# Patient Record
Sex: FEMALE | Race: WHITE | Employment: FULL TIME | URBAN - METROPOLITAN AREA
[De-identification: names, ages, dates, MRNs, and addresses within clinical notes are randomized per-mention and may not be internally consistent; named-entity substitution may affect disease eponyms.]

---

## 2017-01-06 DIAGNOSIS — I25.10 CORONARY ARTERY DISEASE INVOLVING NATIVE CORONARY ARTERY OF NATIVE HEART WITHOUT ANGINA PECTORIS: Primary | ICD-10-CM

## 2017-01-06 DIAGNOSIS — I25.119 CORONARY ARTERY DISEASE INVOLVING NATIVE CORONARY ARTERY OF NATIVE HEART WITH ANGINA PECTORIS (H): ICD-10-CM

## 2017-01-06 DIAGNOSIS — I25.10 CAD (CORONARY ARTERY DISEASE): ICD-10-CM

## 2017-01-06 RX ORDER — METOPROLOL TARTRATE 25 MG/1
25 TABLET, FILM COATED ORAL 2 TIMES DAILY
Qty: 180 TABLET | Refills: 2 | Status: SHIPPED | OUTPATIENT
Start: 2017-01-06 | End: 2017-03-03

## 2017-01-06 RX ORDER — ISOSORBIDE MONONITRATE 60 MG/1
60 TABLET, EXTENDED RELEASE ORAL DAILY
Qty: 90 TABLET | Refills: 2 | Status: SHIPPED | OUTPATIENT
Start: 2017-01-06 | End: 2017-10-10

## 2017-01-06 RX ORDER — ROSUVASTATIN CALCIUM 20 MG/1
20 TABLET, COATED ORAL DAILY
Qty: 90 TABLET | Refills: 2 | Status: SHIPPED | OUTPATIENT
Start: 2017-01-06 | End: 2017-09-29

## 2017-01-06 RX ORDER — AMLODIPINE BESYLATE 5 MG/1
TABLET ORAL
Qty: 120 TABLET | Refills: 2 | Status: SHIPPED | OUTPATIENT
Start: 2017-01-06 | End: 2017-07-03

## 2017-01-16 ENCOUNTER — TELEPHONE (OUTPATIENT)
Dept: CARDIOLOGY | Facility: CLINIC | Age: 57
End: 2017-01-16

## 2017-01-16 NOTE — TELEPHONE ENCOUNTER
"Pt calling to report that she has increased her metoprolol dose back up to 25 mg BID. Pt states after she saw Dr Sheppard 10/2016 she temporarily increased to the 25mg BID as her thyroid was running hyper. Once TSH was normalized she dropped the metoprolol back down to 12.5mg BID. However pt has had \"angina\" again recently so she increased back to 25 mg BID. Pt states her angina symptom is chest pain. She feels better on the 25 mg BID. She works at the  at a dental office so she was able to hook herself up and check her VS. P 52 and /69. Pt states she feels fine with lower pulse, no dizziness or lightheadedness, is able to get in a good workout on the treadmill with increase in HR. She has not had any angina recently. Told pt she can continue on 25 mg BID. If angina returns call us PRN we would not want her further increasing the BB with the lower pulse, we would most likely need to review w/ Dr Sheppard to adjust other meds such as Imdur. Pt understands, will call PRN.   "

## 2017-02-08 ENCOUNTER — TRANSFERRED RECORDS (OUTPATIENT)
Dept: CARDIOLOGY | Facility: CLINIC | Age: 57
End: 2017-02-08

## 2017-02-08 LAB
ALBUMIN SERPL-MCNC: 3.9 G/DL
ALP SERPL-CCNC: 61 U/L
ALT SERPL-CCNC: 62 U/L
ANION GAP SERPL CALCULATED.3IONS-SCNC: NORMAL MMOL/L
AST SERPL-CCNC: 43 U/L
BILIRUB SERPL-MCNC: 0.47 MG/DL
BNP SERPL-MCNC: 23.8 PG/ML
BUN SERPL-MCNC: 22 MG/DL
CALCIUM SERPL-MCNC: 9.6 MG/DL
CHLORIDE SERPLBLD-SCNC: 105 MMOL/L
CK-MB: 1.1 NG/ML
CO2 SERPL-SCNC: NORMAL MMOL/L
CREAT SERPL-MCNC: 1 MG/DL
ERYTHROCYTE [DISTWIDTH] IN BLOOD BY AUTOMATED COUNT: 13 %
GFR SERPL CREATININE-BSD FRML MDRD: NORMAL ML/MIN/1.73M2
GLUCOSE SERPL-MCNC: 114 MG/DL (ref 70–99)
HCT VFR BLD AUTO: 42.2 %
HEMOGLOBIN: 13.9 G/DL
MCH RBC QN AUTO: 30 PG
MCHC RBC AUTO-ENTMCNC: 32.9 G/DL
MCV RBC AUTO: 91.1 FL
PLATELET # BLD AUTO: 216 10^9/L
POTASSIUM SERPL-SCNC: 4.8 MMOL/L
PROT SERPL-MCNC: 7.6 G/DL
RBC # BLD AUTO: 4.63 10^12/L
SODIUM SERPL-SCNC: 141 MMOL/L
TROPONIN I BLD-MCNC: 0.04 UG/L
WBC # BLD AUTO: 5.8 10^9/L

## 2017-02-28 ENCOUNTER — TELEPHONE (OUTPATIENT)
Dept: CARDIOLOGY | Facility: CLINIC | Age: 57
End: 2017-02-28

## 2017-02-28 NOTE — TELEPHONE ENCOUNTER
Pt called and said that she has been experiencing intermittent episodes of chest pressure over past several weeks, both with exertion and while resting. She notices the pressure when she walks on her treadmill, and while laying in her bed at night. She went to urgent care two weeks ago and found out her TSH was very low and now has gone hyperthyroid. Pt was having issues with hypothyroidism earlier this year. She denied feeling like her heart is racing with these episodes. Pt has taken SL nitro with a couple of the episodes, but had trouble telling whether it helped to improve her symptoms. She is anxious about these episodes as the is leaving on a cruise Saturday to [place] and [place]. She has been checking her bp at work recently and yesterday it was 120/64 and after 5 minutes of rest it was 100/54. I told pt she should come in to discuss symptoms further as she does have hx of CAD, including small vessel disease, and it would probably give her some peace of mind before she leaves for her vacation. Pt agreed and scheduled to see  3/1 @ 7275. She said she will bring her urgent care records with her from [facility]. Dylan HASSAN

## 2017-03-01 ENCOUNTER — HOSPITAL ENCOUNTER (OUTPATIENT)
Dept: ULTRASOUND IMAGING | Facility: CLINIC | Age: 57
Discharge: HOME | End: 2017-03-01
Attending: INTERNAL MEDICINE | Admitting: INTERNAL MEDICINE
Payer: COMMERCIAL

## 2017-03-01 ENCOUNTER — OFFICE VISIT (OUTPATIENT)
Dept: CARDIOLOGY | Facility: CLINIC | Age: 57
End: 2017-03-01
Payer: COMMERCIAL

## 2017-03-01 VITALS
WEIGHT: 188.9 LBS | SYSTOLIC BLOOD PRESSURE: 118 MMHG | BODY MASS INDEX: 32.25 KG/M2 | DIASTOLIC BLOOD PRESSURE: 74 MMHG | HEART RATE: 76 BPM | HEIGHT: 64 IN

## 2017-03-01 DIAGNOSIS — I20.0 UNSTABLE ANGINA (H): Primary | ICD-10-CM

## 2017-03-01 DIAGNOSIS — I25.10 CORONARY ARTERY DISEASE INVOLVING NATIVE CORONARY ARTERY OF NATIVE HEART WITHOUT ANGINA PECTORIS: ICD-10-CM

## 2017-03-01 DIAGNOSIS — R09.89 ARTERIAL BRUIT: ICD-10-CM

## 2017-03-01 DIAGNOSIS — E78.2 MIXED HYPERLIPIDEMIA: ICD-10-CM

## 2017-03-01 PROCEDURE — 99215 OFFICE O/P EST HI 40 MIN: CPT | Mod: 25 | Performed by: INTERNAL MEDICINE

## 2017-03-01 PROCEDURE — 93005 ELECTROCARDIOGRAM TRACING: CPT | Performed by: INTERNAL MEDICINE

## 2017-03-01 PROCEDURE — 93880 EXTRACRANIAL BILAT STUDY: CPT | Mod: 26 | Performed by: INTERNAL MEDICINE

## 2017-03-01 PROCEDURE — 93880 EXTRACRANIAL BILAT STUDY: CPT

## 2017-03-01 RX ORDER — POTASSIUM CHLORIDE 1500 MG/1
20 TABLET, EXTENDED RELEASE ORAL
Status: CANCELLED | OUTPATIENT
Start: 2017-03-01

## 2017-03-01 RX ORDER — NITROGLYCERIN 0.4 MG/1
0.4 TABLET SUBLINGUAL EVERY 5 MIN PRN
Qty: 25 TABLET | Refills: 3 | Status: SHIPPED | OUTPATIENT
Start: 2017-03-01 | End: 2019-01-30

## 2017-03-01 RX ORDER — LIDOCAINE 40 MG/G
CREAM TOPICAL
Status: CANCELLED | OUTPATIENT
Start: 2017-03-01

## 2017-03-01 RX ORDER — SODIUM CHLORIDE 9 MG/ML
INJECTION, SOLUTION INTRAVENOUS CONTINUOUS
Status: CANCELLED | OUTPATIENT
Start: 2017-03-01

## 2017-03-01 RX ORDER — ASPIRIN 81 MG/1
81 TABLET ORAL DAILY
Status: CANCELLED | OUTPATIENT
Start: 2017-03-01

## 2017-03-01 RX ORDER — LIOTHYRONINE SODIUM 5 UG/1
5 TABLET ORAL DAILY
COMMUNITY

## 2017-03-01 NOTE — MR AVS SNAPSHOT
After Visit Summary   3/1/2017    Marli Nguyen    MRN: [id number]           Patient Information     Date Of Birth          1960        Visit Information        Provider Department      3/1/2017 12:45 PM Joana Sheppard DO [facility] [place]        Today's Diagnoses     Unstable angina (H)    -  1    Coronary artery disease involving native coronary artery of native heart without angina pectoris        Mixed hyperlipidemia        Arterial bruit           Follow-ups after your visit        Future tests that were ordered for you today     Open Future Orders        Priority Expected Expires Ordered    Cardiac Cath: Coronary Angiography Adult Order Routine 3/8/2017 2/24/2018 3/1/2017    US Carotid Bilateral Routine 3/9/2017 3/1/2018 3/1/2017            Who to contact     If you have questions or need follow up information about today's clinic visit or your schedule please contact [facility] [place] directly at [phone].  Normal or non-critical lab and imaging results will be communicated to you by MyChart, letter or phone within 4 business days after the clinic has received the results. If you do not hear from us within 7 days, please contact the clinic through [employer] or phone. If you have a critical or abnormal lab result, we will notify you by phone as soon as possible.  Submit refill requests through [employer] or call your pharmacy and they will forward the refill request to us. Please allow 3 business days for your refill to be completed.          Additional Information About Your Visit        [employer] Information     [employer] gives you secure access to your electronic health record. If you see a primary care provider, you can also send messages to your care team and make appointments. If you have questions, please call your primary care clinic.  If you do not have a primary care provider, please call [phone]  "and they will assist you.        Care EveryWhere ID     This is your Care EveryWhere ID. This could be used by other organizations to access your [facility] medical records  [id number]        Your Vitals Were     Pulse Height BMI (Body Mass Index)             76 1.613 m (5' 3.5\") 32.94 kg/m2          Blood Pressure from Last 3 Encounters:   03/01/17 118/74   10/27/16 114/72   10/14/16 113/68    Weight from Last 3 Encounters:   03/01/17 85.7 kg (188 lb 14.4 oz)   10/27/16 83.7 kg (184 lb 8 oz)   10/12/16 81.3 kg (179 lb 2 oz)              We Performed the Following     EKG 12-lead complete w/read - Clinics (performed today)          Where to get your medicines      These medications were sent to [facility]/pharmacy #[id number] - [place], [place] - [address]  [address]  [address] , [facility] [address]     Phone:  [phone]     nitroglycerin 0.4 MG sublingual tablet          Primary Care Provider Office Phone # Fax #    Aixa Brantley [phone]       [facility] [address]  [facility] [address]        Thank you!     Thank you for choosing [facility] [place]  for your care. Our goal is always to provide you with excellent care. Hearing back from our patients is one way we can continue to improve our services. Please take a few minutes to complete the written survey that you may receive in the mail after your visit with us. Thank you!             Your Updated Medication List - Protect others around you: Learn how to safely use, store and throw away your medicines at www.disposemymeds.org.          This list is accurate as of: 3/1/17  1:14 PM.  Always use your most recent med list.                   Brand Name Dispense Instructions for use    amLODIPine 5 MG tablet    NORVASC    120 tablet    Take 5 mg in AM, take 1/2 tab-2.5 mg in PM       arginine 500 MG tablet      Take 500 mg by mouth 2 times daily       aspirin 81 MG EC tablet      Take 162 mg by " mouth At Bedtime       CALCIUM-VITAMIN D PO      Take 1 tablet by mouth every evening (strength unknown, does note that calcium is 800 mg)       COENZYME Q-10 PO      Take 100 mg by mouth daily       CYTOMEL 5 MCG tablet   Generic drug:  liothyronine      Take 5 mcg by mouth daily       isosorbide mononitrate 60 MG 24 hr tablet    IMDUR    90 tablet    Take 1 tablet (60 mg) by mouth daily       MAGNESIUM OXIDE PO      Take 200 mg by mouth every evening       metoprolol 25 MG tablet    LOPRESSOR    180 tablet    Take 1 tablet (25 mg) by mouth 2 times daily       nitroglycerin 0.4 MG sublingual tablet    NITROSTAT    25 tablet    Place 1 tablet (0.4 mg) under the tongue every 5 minutes as needed       OMEGA-3 FISH OIL PO      Take 2 g by mouth 2 times daily       PROLIA 60 MG/ML Soln injection   Generic drug:  denosumab      Inject 60 mg Subcutaneous every 6 months       rosuvastatin 20 MG tablet    CRESTOR    90 tablet    Take 1 tablet (20 mg) by mouth daily       SYNTHROID PO      Take 88 mcg by mouth       VITAMIN D (CHOLECALCIFEROL) PO      Take 1,000 Units by mouth every evening

## 2017-03-01 NOTE — LETTER
3/1/2017    Aixa Brantley MD  [facility]   [address]  [place] [address]    RE: Marli HUERTA Wendy       Dear Colleague,    I had the pleasure of seeing Marli HUERTA Wendy in the [facility].    REFERRING PHYSICIAN:  Dr. Aixa Brantley.      Ms. Nguyen is a very pleasant 56-year-old female with a history of coronary artery disease, previous 2-vessel CABG in 2006 with a LIMA graft to the LAD and vein graft to an obtuse marginal 2.  She had direct left main stenting in 2013.  She is here for a followup visit today.  She has had recurrent symptoms of chest discomfort with radiation down her left arm and into her jaw.  She was seen at urgent care in [place] at the beginning of this month and underwent some testing including blood work with troponin and electrocardiogram.  I was able to review these with her today.  She had normal cardiac enzymes, and her EKG was fairly unremarkable.  She was asked to follow up in clinic today.  Of note, she has had much difficulty controlling her thyroid.  They did check a TSH at that time and it was quite low at 0.121.  She has had titration of her thyroid medication recently but notes that she does have chest pain with the higher dose, and therefore, it sounds like they did back off on this dose somewhat.  She has exertional symptoms, but her symptoms also are precipitated by emotional stress and she does endorse quite a bit of stress at work currently that is aggravating her condition.  She did have an episode of rest pain this morning.  She took 3 sublingual nitroglycerin spaced several minutes apart with ultimate relief of her symptoms.      PHYSICAL EXAMINATION:   VITAL SIGNS:  On exam today, her blood pressure is 118/74, pulse is 76, weight 188 pounds with a body mass index of 33.   NECK:  Carotid upstrokes seem brisk.  I do not appreciate carotid bruits.   CARDIOVASCULAR:  Tones are regular.  I do not hear a murmur, gallop  or rub.   LUNGS:  Clear posteriorly.  She has diminished pulse noted on the left radial as compared to her right radial, and therefore I did auscultate the upper chest area around her subclavian artery, and I do hear a soft murmur here, so there is concern that there may be some subclavian artery stenosis.   EXTREMITIES:  She has no peripheral edema.     Outpatient Encounter Prescriptions as of 3/1/2017   Medication Sig Dispense Refill     liothyronine (CYTOMEL) 5 MCG tablet Take 5 mcg by mouth daily       nitroglycerin (NITROSTAT) 0.4 MG sublingual tablet Place 1 tablet (0.4 mg) under the tongue every 5 minutes as needed 25 tablet 3     rosuvastatin (CRESTOR) 20 MG tablet Take 1 tablet (20 mg) by mouth daily 90 tablet 2     amLODIPine (NORVASC) 5 MG tablet Take 5 mg in AM, take 1/2 tab-2.5 mg in  tablet 2     isosorbide mononitrate (IMDUR) 60 MG 24 hr tablet Take 1 tablet (60 mg) by mouth daily 90 tablet 2     [DISCONTINUED] metoprolol (LOPRESSOR) 25 MG tablet Take 1 tablet (25 mg) by mouth 2 times daily 180 tablet 2     denosumab (PROLIA) 60 MG/ML SOLN Inject 60 mg Subcutaneous every 6 months       CALCIUM-VITAMIN D PO Take 1 tablet by mouth every evening (strength unknown, does note that calcium is 800 mg)       VITAMIN D, CHOLECALCIFEROL, PO Take 1,000 Units by mouth every evening       MAGNESIUM OXIDE PO Take 200 mg by mouth every evening       COENZYME Q-10 PO Take 100 mg by mouth daily       arginine 500 MG tablet Take 500 mg by mouth 2 times daily        Omega-3 Fatty Acids (OMEGA-3 FISH OIL PO) Take 2 g by mouth 2 times daily        aspirin 81 MG EC tablet Take 162 mg by mouth At Bedtime        Levothyroxine Sodium (SYNTHROID PO) Take 88 mcg by mouth daily        [DISCONTINUED] nitroglycerin (NITROSTAT) 0.4 MG SL tablet Place 1 tablet (0.4 mg) under the tongue every 5 minutes as needed 25 tablet 3     No facility-administered encounter medications on file as of 3/1/2017.       ASSESSMENT AND PLAN:  In  summary, Ms. Nguyen is a very pleasant 56-year-old female with a history of coronary disease, previous 2-vessel CABG with the LIMA graft to LAD and vein graft to obtuse marginal.  She has direct left main stenting as well.  She is having symptoms highly suggestive of unstable angina with an episode of rest pain this morning that was relieved after 3 sublingual nitroglycerin.  Her picture, of course, is complicated by a trip that she has planned on Saturday out of the country.  I feel very uncomfortable with her continuing with this trip without ischemic evaluation.  I do not feel a stress test would be the best option given that she has had previous left main stenting and bypass surgery.  If she should have a stenosis at that left main stent, she may end up having what is perceived as balanced ischemia or a normal stress test with nuclear perfusion imaging.  I would prefer that we define her anatomy with coronary angiogram, and I explained this to her today.  In addition to this, I would like to evaluate her subclavian artery.  She does have a LIMA graft to the LAD, and certainly subclavian artery stenosis is of concern here too based upon the bruit I auscultate on exam in the left upper chest area.  I did explain the risks, benefits and alternatives about the angiogram.  She is well skilled in this and has had it several times and does not have any questions or concerns and is willing to proceed at this time.  We talked about possibly increasing her beta blocker to 50 mg twice daily, especially since her symptoms do seem to be catacholmine driven with symptoms with escalated thyroid supplementation and also emotional stress.  We will certainly consider this, but again I would like to do an ischemic evaluation with coronary angiogram and see just what we are dealing with here.  I will have her follow up with the results of the above testing once it is complete.  Please feel free to contact me with any questions you  have in regards to her care.      Again, thank you for allowing me to participate in the care of your patient.      Sincerely,    Joana Sheppard,      [facility]

## 2017-03-01 NOTE — PROGRESS NOTES
HPI and Plan:   See dictation    Orders Placed This Encounter   Procedures     US Carotid Bilateral     EKG 12-lead complete w/read - Clinics (performed today)       Orders Placed This Encounter   Medications     liothyronine (CYTOMEL) 5 MCG tablet     Sig: Take 5 mcg by mouth daily     nitroglycerin (NITROSTAT) 0.4 MG sublingual tablet     Sig: Place 1 tablet (0.4 mg) under the tongue every 5 minutes as needed     Dispense:  25 tablet     Refill:  3       Medications Discontinued During This Encounter   Medication Reason     nitroglycerin (NITROSTAT) 0.4 MG SL tablet Reorder         Encounter Diagnoses   Name Primary?     Unstable angina (H) Yes     Coronary artery disease involving native coronary artery of native heart without angina pectoris      Mixed hyperlipidemia      Arterial bruit        CURRENT MEDICATIONS:  Current Outpatient Prescriptions   Medication Sig Dispense Refill     liothyronine (CYTOMEL) 5 MCG tablet Take 5 mcg by mouth daily       nitroglycerin (NITROSTAT) 0.4 MG sublingual tablet Place 1 tablet (0.4 mg) under the tongue every 5 minutes as needed 25 tablet 3     rosuvastatin (CRESTOR) 20 MG tablet Take 1 tablet (20 mg) by mouth daily 90 tablet 2     amLODIPine (NORVASC) 5 MG tablet Take 5 mg in AM, take 1/2 tab-2.5 mg in  tablet 2     metoprolol (LOPRESSOR) 25 MG tablet Take 1 tablet (25 mg) by mouth 2 times daily 180 tablet 2     isosorbide mononitrate (IMDUR) 60 MG 24 hr tablet Take 1 tablet (60 mg) by mouth daily 90 tablet 2     denosumab (PROLIA) 60 MG/ML SOLN Inject 60 mg Subcutaneous every 6 months       CALCIUM-VITAMIN D PO Take 1 tablet by mouth every evening (strength unknown, does note that calcium is 800 mg)       VITAMIN D, CHOLECALCIFEROL, PO Take 1,000 Units by mouth every evening       MAGNESIUM OXIDE PO Take 200 mg by mouth every evening       COENZYME Q-10 PO Take 100 mg by mouth daily       arginine 500 MG tablet Take 500 mg by mouth 2 times daily        Omega-3 Fatty  Acids (OMEGA-3 FISH OIL PO) Take 2 g by mouth 2 times daily        aspirin 81 MG EC tablet Take 162 mg by mouth At Bedtime        Levothyroxine Sodium (SYNTHROID PO) Take 88 mcg by mouth        [DISCONTINUED] nitroglycerin (NITROSTAT) 0.4 MG SL tablet Place 1 tablet (0.4 mg) under the tongue every 5 minutes as needed 25 tablet 3       ALLERGIES     Allergies   Allergen Reactions     Bactrim [Sulfamethoxazole W/Trimethoprim]      rash     Erythromycin Cramps     Sulfa Drugs      Tape [Adhesive Tape]        PAST MEDICAL HISTORY:  Past Medical History   Diagnosis Date     Chronic left hip pain      Coronary artery disease      s/p CABG 2006 (LIMA to LAD and saphneous vein graft to OM1 and OM3), and KERRI to left main in 2013     DM type 2 (diabetes mellitus, type 2) (H)      Gallstone pancreatitis 2012     Graves disease      s/p radioiodide therapy in 2001, now takes synthroid     Hyperlipidemia      Hypertension        PAST SURGICAL HISTORY:  Past Surgical History   Procedure Laterality Date     Lasik bilateral       Right thumb mass excision  2008     Cholecystectomy  4/12     Carpal tunnel release rt/lt       Dilation and curettage, hysteroscopy, ablate endometrium novasure, combined  6/28/2012     Procedure: COMBINED DILATION AND CURETTAGE, HYSTEROSCOPY, ABLATE ENDOMETRIUM NOVASURE;  DILATION AND CURETTAGE, HYSTEROSCOPY, ABLATE ENDOMETRIUM,pelvic exam under anesthesia;  Surgeon: Johnna Harley MD;  Location: [facility] OR     Laparoscopic hysterectomy total, bilateral salpingo-oophorectomy, combined  9/20/2012     Procedure: COMBINED LAPAROSCOPIC HYSTERECTOMY TOTAL, SALPINGO-OOPHORECTOMY;  LAPAROSCOPIC HYSTERECTOMY TOTAL, SALPINGO-OOPHORECTOMY;  Surgeon: Johnna Harley MD;  Location: [facility] OR     [place] teeth removal       Repair vaginal cuff  10/31/2012     Procedure: REPAIR VAGINAL CUFF;  Irrigation and repair of Vaginal Cuff;  Surgeon: Johnna Harley MD;  Location: [facility] OR     Coronary artery bypass  3/2006     LIMA  to LAD, sequential SV grafts to OM1 and OM3     Coronary angiography adult order  11/2006     70% ostial left main lesion, 50-60% mid LAD stenosis prox. to LIMA insertion site, circumflex with widely patent saph vein graft into first obtuse marginal, tiny posterolateral branch occluded     Coronary angiography adult order  12/2006     attempted circ OM3 PTCA     Coronary angiography adult order  6/2013     KERRI to ostial left main, widely patent LIMA to LAD and saphenous vein graft     Coronary angiography adult order  1/2014     left main stent widely patent, LIMA to LAD and vein graft to first obtuse marginal patent     Coronary angiography adult order  10/13/16     Left main:there is evidence of previous stent implantation in the left main with no evidence of restenosis. The distal and mid LAD are free of signigficant narrowing. The bypass graft to the marginal branch is widely patent. The study is unchanged since her last study on 11/14/2014.       FAMILY HISTORY:  History reviewed. No pertinent family history.    SOCIAL HISTORY:  Social History     Social History     Marital status:      Spouse name: N/A     Number of children: N/A     Years of education: N/A     Social History Main Topics     Smoking status: Never Smoker     Smokeless tobacco: Never Used     Alcohol use 0.0 oz/week     0 Standard drinks or equivalent per week      Comment: rare     Drug use: No     Sexual activity: Not Asked     Other Topics Concern     Caffeine Concern Yes     10 oz of coffee per day     Sleep Concern No     Weight Concern Yes     weight gain     Special Diet Yes     gluten free     Exercise Yes     walking 2 miles daily     Social History Narrative       Review of Systems:  Skin:  Negative       Eyes:  Positive for glasses    ENT:  Negative      Respiratory:  Negative       Cardiovascular:  Negative;palpitations;syncope or near-syncope;cyanosis;edema;lightheadedness;dizziness Positive for;fatigue;exercise intolerance  "palpitations yesterday, chest pressure, took nitro X3 at 4am, effective  Gastroenterology: Negative      Genitourinary:  Negative      Musculoskeletal:  Positive for back pain    Neurologic:  Negative      Psychiatric:  Positive for sleep disturbances;excessive stress;anxiety    Heme/Lymph/Imm:  Negative      Endocrine:  Positive for thyroid disorder      Physical Exam:  Vitals: /74  Pulse 76  Ht 1.613 m (5' 3.5\")  Wt 85.7 kg (188 lb 14.4 oz)  BMI 32.94 kg/m2    Constitutional:           Skin:           Head:           Eyes:           ENT:           Neck:           Chest:             Cardiac:                    Abdomen:           Vascular:                                          Extremities and Back:                 Neurological:                 CC  No referring provider defined for this encounter.                  "

## 2017-03-02 ENCOUNTER — APPOINTMENT (OUTPATIENT)
Dept: CARDIOLOGY | Facility: CLINIC | Age: 57
End: 2017-03-02
Attending: INTERNAL MEDICINE
Payer: COMMERCIAL

## 2017-03-02 ENCOUNTER — HOSPITAL ENCOUNTER (OUTPATIENT)
Facility: CLINIC | Age: 57
Discharge: HOME | End: 2017-03-02
Attending: INTERNAL MEDICINE | Admitting: INTERNAL MEDICINE
Payer: COMMERCIAL

## 2017-03-02 VITALS
OXYGEN SATURATION: 98 % | BODY MASS INDEX: 33.49 KG/M2 | TEMPERATURE: 97.9 F | HEIGHT: 63 IN | HEART RATE: 51 BPM | DIASTOLIC BLOOD PRESSURE: 66 MMHG | SYSTOLIC BLOOD PRESSURE: 124 MMHG | RESPIRATION RATE: 16 BRPM | WEIGHT: 189 LBS

## 2017-03-02 DIAGNOSIS — I20.0 UNSTABLE ANGINA (H): ICD-10-CM

## 2017-03-02 DIAGNOSIS — R09.89 ARTERIAL BRUIT: ICD-10-CM

## 2017-03-02 DIAGNOSIS — I25.10 CORONARY ARTERY DISEASE INVOLVING NATIVE CORONARY ARTERY OF NATIVE HEART WITHOUT ANGINA PECTORIS: ICD-10-CM

## 2017-03-02 DIAGNOSIS — E78.2 MIXED HYPERLIPIDEMIA: ICD-10-CM

## 2017-03-02 LAB
ANION GAP SERPL CALCULATED.3IONS-SCNC: 5 MMOL/L (ref 3–14)
APTT PPP: 31 SEC (ref 22–37)
BUN SERPL-MCNC: 22 MG/DL (ref 7–30)
CALCIUM SERPL-MCNC: 9.3 MG/DL (ref 8.5–10.1)
CHLORIDE SERPL-SCNC: 109 MMOL/L (ref 94–109)
CO2 SERPL-SCNC: 29 MMOL/L (ref 20–32)
CREAT SERPL-MCNC: 0.86 MG/DL (ref 0.52–1.04)
ERYTHROCYTE [DISTWIDTH] IN BLOOD BY AUTOMATED COUNT: 13.7 % (ref 10–15)
GFR SERPL CREATININE-BSD FRML MDRD: 67 ML/MIN/1.7M2
GLUCOSE SERPL-MCNC: 114 MG/DL (ref 70–99)
HCT VFR BLD AUTO: 43.9 % (ref 35–47)
HGB BLD-MCNC: 14.7 G/DL (ref 11.7–15.7)
INR PPP: 0.92 (ref 0.86–1.14)
MCH RBC QN AUTO: 30.5 PG (ref 26.5–33)
MCHC RBC AUTO-ENTMCNC: 33.5 G/DL (ref 31.5–36.5)
MCV RBC AUTO: 91 FL (ref 78–100)
PLATELET # BLD AUTO: 204 10E9/L (ref 150–450)
POTASSIUM SERPL-SCNC: 4.7 MMOL/L (ref 3.4–5.3)
RBC # BLD AUTO: 4.82 10E12/L (ref 3.8–5.2)
SODIUM SERPL-SCNC: 143 MMOL/L (ref 133–144)
WBC # BLD AUTO: 5.6 10E9/L (ref 4–11)

## 2017-03-02 PROCEDURE — B2111ZZ FLUOROSCOPY OF MULTIPLE CORONARY ARTERIES USING LOW OSMOLAR CONTRAST: ICD-10-PCS | Performed by: INTERNAL MEDICINE

## 2017-03-02 PROCEDURE — 93459 L HRT ART/GRFT ANGIO: CPT

## 2017-03-02 PROCEDURE — 93010 ELECTROCARDIOGRAM REPORT: CPT | Performed by: INTERNAL MEDICINE

## 2017-03-02 PROCEDURE — 99153 MOD SED SAME PHYS/QHP EA: CPT

## 2017-03-02 PROCEDURE — 99152 MOD SED SAME PHYS/QHP 5/>YRS: CPT

## 2017-03-02 PROCEDURE — 27211089 ZZH KIT ACIST INJECTOR CR3

## 2017-03-02 PROCEDURE — 85027 COMPLETE CBC AUTOMATED: CPT | Performed by: INTERNAL MEDICINE

## 2017-03-02 PROCEDURE — 40000852 ZZH STATISTIC HEART CATH LAB OR EP LAB

## 2017-03-02 PROCEDURE — 93459 L HRT ART/GRFT ANGIO: CPT | Mod: 26 | Performed by: INTERNAL MEDICINE

## 2017-03-02 PROCEDURE — 25000128 H RX IP 250 OP 636: Performed by: INTERNAL MEDICINE

## 2017-03-02 PROCEDURE — 25000125 ZZHC RX 250: Performed by: INTERNAL MEDICINE

## 2017-03-02 PROCEDURE — 99153 MOD SED SAME PHYS/QHP EA: CPT | Mod: GC | Performed by: INTERNAL MEDICINE

## 2017-03-02 PROCEDURE — 36215 PLACE CATHETER IN ARTERY: CPT | Mod: XU

## 2017-03-02 PROCEDURE — B2151ZZ FLUOROSCOPY OF LEFT HEART USING LOW OSMOLAR CONTRAST: ICD-10-PCS | Performed by: INTERNAL MEDICINE

## 2017-03-02 PROCEDURE — B3121ZZ FLUOROSCOPY OF LEFT SUBCLAVIAN ARTERY USING LOW OSMOLAR CONTRAST: ICD-10-PCS | Performed by: INTERNAL MEDICINE

## 2017-03-02 PROCEDURE — 80048 BASIC METABOLIC PNL TOTAL CA: CPT | Performed by: INTERNAL MEDICINE

## 2017-03-02 PROCEDURE — 27210787 ZZH MANIFOLD CR2

## 2017-03-02 PROCEDURE — 85610 PROTHROMBIN TIME: CPT | Performed by: INTERNAL MEDICINE

## 2017-03-02 PROCEDURE — 99152 MOD SED SAME PHYS/QHP 5/>YRS: CPT | Mod: GC | Performed by: INTERNAL MEDICINE

## 2017-03-02 PROCEDURE — 93005 ELECTROCARDIOGRAM TRACING: CPT

## 2017-03-02 PROCEDURE — 25000132 ZZH RX MED GY IP 250 OP 250 PS 637: Performed by: INTERNAL MEDICINE

## 2017-03-02 PROCEDURE — 75710 ARTERY X-RAYS ARM/LEG: CPT | Mod: XU

## 2017-03-02 PROCEDURE — 85730 THROMBOPLASTIN TIME PARTIAL: CPT | Performed by: INTERNAL MEDICINE

## 2017-03-02 PROCEDURE — 27210795 ZZH PAD DEFIB QUICK CR4

## 2017-03-02 PROCEDURE — 4A023N7 MEASUREMENT OF CARDIAC SAMPLING AND PRESSURE, LEFT HEART, PERCUTANEOUS APPROACH: ICD-10-PCS | Performed by: INTERNAL MEDICINE

## 2017-03-02 PROCEDURE — 36415 COLL VENOUS BLD VENIPUNCTURE: CPT | Performed by: INTERNAL MEDICINE

## 2017-03-02 PROCEDURE — 27210946 ZZH KIT HC TOTES DISP CR8

## 2017-03-02 RX ORDER — BUPIVACAINE HYDROCHLORIDE 2.5 MG/ML
1-10 INJECTION, SOLUTION EPIDURAL; INFILTRATION; INTRACAUDAL
Status: DISCONTINUED | OUTPATIENT
Start: 2017-03-02 | End: 2017-03-02 | Stop reason: HOSPADM

## 2017-03-02 RX ORDER — ACETAMINOPHEN 325 MG/1
325-650 TABLET ORAL EVERY 4 HOURS PRN
Status: DISCONTINUED | OUTPATIENT
Start: 2017-03-02 | End: 2017-03-02 | Stop reason: HOSPADM

## 2017-03-02 RX ORDER — FUROSEMIDE 10 MG/ML
20-100 INJECTION INTRAMUSCULAR; INTRAVENOUS
Status: DISCONTINUED | OUTPATIENT
Start: 2017-03-02 | End: 2017-03-02 | Stop reason: HOSPADM

## 2017-03-02 RX ORDER — NITROGLYCERIN 5 MG/ML
100-500 VIAL (ML) INTRAVENOUS
Status: DISCONTINUED | OUTPATIENT
Start: 2017-03-02 | End: 2017-03-02 | Stop reason: HOSPADM

## 2017-03-02 RX ORDER — LIDOCAINE HYDROCHLORIDE 10 MG/ML
30 INJECTION, SOLUTION EPIDURAL; INFILTRATION; INTRACAUDAL; PERINEURAL
Status: DISCONTINUED | OUTPATIENT
Start: 2017-03-02 | End: 2017-03-02 | Stop reason: HOSPADM

## 2017-03-02 RX ORDER — NALOXONE HYDROCHLORIDE 0.4 MG/ML
.2-.4 INJECTION, SOLUTION INTRAMUSCULAR; INTRAVENOUS; SUBCUTANEOUS
Status: DISCONTINUED | OUTPATIENT
Start: 2017-03-02 | End: 2017-03-02 | Stop reason: HOSPADM

## 2017-03-02 RX ORDER — FENTANYL CITRATE 50 UG/ML
25-50 INJECTION, SOLUTION INTRAMUSCULAR; INTRAVENOUS
Status: DISCONTINUED | OUTPATIENT
Start: 2017-03-02 | End: 2017-03-02 | Stop reason: HOSPADM

## 2017-03-02 RX ORDER — POTASSIUM CHLORIDE 29.8 MG/ML
20 INJECTION INTRAVENOUS
Status: DISCONTINUED | OUTPATIENT
Start: 2017-03-02 | End: 2017-03-02 | Stop reason: HOSPADM

## 2017-03-02 RX ORDER — DOBUTAMINE HYDROCHLORIDE 200 MG/100ML
2-20 INJECTION INTRAVENOUS CONTINUOUS PRN
Status: DISCONTINUED | OUTPATIENT
Start: 2017-03-02 | End: 2017-03-02 | Stop reason: HOSPADM

## 2017-03-02 RX ORDER — VERAPAMIL HYDROCHLORIDE 2.5 MG/ML
1-2.5 INJECTION, SOLUTION INTRAVENOUS
Status: DISCONTINUED | OUTPATIENT
Start: 2017-03-02 | End: 2017-03-02 | Stop reason: HOSPADM

## 2017-03-02 RX ORDER — PROTAMINE SULFATE 10 MG/ML
25-100 INJECTION, SOLUTION INTRAVENOUS EVERY 5 MIN PRN
Status: DISCONTINUED | OUTPATIENT
Start: 2017-03-02 | End: 2017-03-02 | Stop reason: HOSPADM

## 2017-03-02 RX ORDER — NALOXONE HYDROCHLORIDE 0.4 MG/ML
0.4 INJECTION, SOLUTION INTRAMUSCULAR; INTRAVENOUS; SUBCUTANEOUS EVERY 5 MIN PRN
Status: DISCONTINUED | OUTPATIENT
Start: 2017-03-02 | End: 2017-03-02 | Stop reason: HOSPADM

## 2017-03-02 RX ORDER — DEXTROSE MONOHYDRATE 25 G/50ML
12.5-5 INJECTION, SOLUTION INTRAVENOUS EVERY 30 MIN PRN
Status: DISCONTINUED | OUTPATIENT
Start: 2017-03-02 | End: 2017-03-02 | Stop reason: HOSPADM

## 2017-03-02 RX ORDER — METHYLPREDNISOLONE SODIUM SUCCINATE 125 MG/2ML
125 INJECTION, POWDER, LYOPHILIZED, FOR SOLUTION INTRAMUSCULAR; INTRAVENOUS
Status: DISCONTINUED | OUTPATIENT
Start: 2017-03-02 | End: 2017-03-02 | Stop reason: HOSPADM

## 2017-03-02 RX ORDER — ISOSORBIDE MONONITRATE 30 MG/1
30 TABLET, EXTENDED RELEASE ORAL EVERY MORNING
Qty: 90 TABLET | Refills: 3 | Status: SHIPPED | OUTPATIENT
Start: 2017-03-02 | End: 2017-03-02

## 2017-03-02 RX ORDER — ASPIRIN 81 MG/1
81-324 TABLET, CHEWABLE ORAL
Status: DISCONTINUED | OUTPATIENT
Start: 2017-03-02 | End: 2017-03-02 | Stop reason: HOSPADM

## 2017-03-02 RX ORDER — LIDOCAINE HYDROCHLORIDE 10 MG/ML
1-10 INJECTION, SOLUTION EPIDURAL; INFILTRATION; INTRACAUDAL; PERINEURAL
Status: COMPLETED | OUTPATIENT
Start: 2017-03-02 | End: 2017-03-02

## 2017-03-02 RX ORDER — HYDROCODONE BITARTRATE AND ACETAMINOPHEN 5; 325 MG/1; MG/1
1-2 TABLET ORAL EVERY 4 HOURS PRN
Status: DISCONTINUED | OUTPATIENT
Start: 2017-03-02 | End: 2017-03-02 | Stop reason: HOSPADM

## 2017-03-02 RX ORDER — ONDANSETRON 2 MG/ML
4 INJECTION INTRAMUSCULAR; INTRAVENOUS EVERY 4 HOURS PRN
Status: DISCONTINUED | OUTPATIENT
Start: 2017-03-02 | End: 2017-03-02 | Stop reason: HOSPADM

## 2017-03-02 RX ORDER — LORAZEPAM 2 MG/ML
.5-2 INJECTION INTRAMUSCULAR EVERY 4 HOURS PRN
Status: DISCONTINUED | OUTPATIENT
Start: 2017-03-02 | End: 2017-03-02 | Stop reason: HOSPADM

## 2017-03-02 RX ORDER — SODIUM CHLORIDE 9 MG/ML
INJECTION, SOLUTION INTRAVENOUS CONTINUOUS
Status: DISCONTINUED | OUTPATIENT
Start: 2017-03-02 | End: 2017-03-02 | Stop reason: HOSPADM

## 2017-03-02 RX ORDER — HEPARIN SODIUM 1000 [USP'U]/ML
1000-10000 INJECTION, SOLUTION INTRAVENOUS; SUBCUTANEOUS EVERY 5 MIN PRN
Status: DISCONTINUED | OUTPATIENT
Start: 2017-03-02 | End: 2017-03-02 | Stop reason: HOSPADM

## 2017-03-02 RX ORDER — POTASSIUM CHLORIDE 7.45 MG/ML
10 INJECTION INTRAVENOUS
Status: DISCONTINUED | OUTPATIENT
Start: 2017-03-02 | End: 2017-03-02 | Stop reason: HOSPADM

## 2017-03-02 RX ORDER — PHENYLEPHRINE HCL IN 0.9% NACL 1 MG/10 ML
20-100 SYRINGE (ML) INTRAVENOUS
Status: DISCONTINUED | OUTPATIENT
Start: 2017-03-02 | End: 2017-03-02 | Stop reason: HOSPADM

## 2017-03-02 RX ORDER — MORPHINE SULFATE 2 MG/ML
1-2 INJECTION, SOLUTION INTRAMUSCULAR; INTRAVENOUS EVERY 5 MIN PRN
Status: DISCONTINUED | OUTPATIENT
Start: 2017-03-02 | End: 2017-03-02 | Stop reason: HOSPADM

## 2017-03-02 RX ORDER — CLOPIDOGREL BISULFATE 75 MG/1
300-600 TABLET ORAL
Status: DISCONTINUED | OUTPATIENT
Start: 2017-03-02 | End: 2017-03-02 | Stop reason: HOSPADM

## 2017-03-02 RX ORDER — NITROGLYCERIN 20 MG/100ML
.07-2 INJECTION INTRAVENOUS CONTINUOUS PRN
Status: DISCONTINUED | OUTPATIENT
Start: 2017-03-02 | End: 2017-03-02 | Stop reason: HOSPADM

## 2017-03-02 RX ORDER — NITROGLYCERIN 0.4 MG/1
0.4 TABLET SUBLINGUAL EVERY 5 MIN PRN
Status: DISCONTINUED | OUTPATIENT
Start: 2017-03-02 | End: 2017-03-02 | Stop reason: HOSPADM

## 2017-03-02 RX ORDER — CLOPIDOGREL BISULFATE 75 MG/1
75 TABLET ORAL
Status: DISCONTINUED | OUTPATIENT
Start: 2017-03-02 | End: 2017-03-02 | Stop reason: HOSPADM

## 2017-03-02 RX ORDER — LIDOCAINE 40 MG/G
CREAM TOPICAL
Status: DISCONTINUED | OUTPATIENT
Start: 2017-03-02 | End: 2017-03-02 | Stop reason: HOSPADM

## 2017-03-02 RX ORDER — PROMETHAZINE HYDROCHLORIDE 25 MG/ML
6.25-25 INJECTION, SOLUTION INTRAMUSCULAR; INTRAVENOUS EVERY 4 HOURS PRN
Status: DISCONTINUED | OUTPATIENT
Start: 2017-03-02 | End: 2017-03-02 | Stop reason: HOSPADM

## 2017-03-02 RX ORDER — FLUMAZENIL 0.1 MG/ML
0.2 INJECTION, SOLUTION INTRAVENOUS
Status: DISCONTINUED | OUTPATIENT
Start: 2017-03-02 | End: 2017-03-02 | Stop reason: HOSPADM

## 2017-03-02 RX ORDER — EPTIFIBATIDE 2 MG/ML
180 INJECTION, SOLUTION INTRAVENOUS EVERY 10 MIN PRN
Status: DISCONTINUED | OUTPATIENT
Start: 2017-03-02 | End: 2017-03-02 | Stop reason: HOSPADM

## 2017-03-02 RX ORDER — NALOXONE HYDROCHLORIDE 0.4 MG/ML
.1-.4 INJECTION, SOLUTION INTRAMUSCULAR; INTRAVENOUS; SUBCUTANEOUS
Status: DISCONTINUED | OUTPATIENT
Start: 2017-03-02 | End: 2017-03-02 | Stop reason: HOSPADM

## 2017-03-02 RX ORDER — PRASUGREL 10 MG/1
10-60 TABLET, FILM COATED ORAL
Status: DISCONTINUED | OUTPATIENT
Start: 2017-03-02 | End: 2017-03-02 | Stop reason: HOSPADM

## 2017-03-02 RX ORDER — POTASSIUM CHLORIDE 1500 MG/1
20 TABLET, EXTENDED RELEASE ORAL
Status: DISCONTINUED | OUTPATIENT
Start: 2017-03-02 | End: 2017-03-02 | Stop reason: HOSPADM

## 2017-03-02 RX ORDER — NICARDIPINE HYDROCHLORIDE 2.5 MG/ML
100 INJECTION INTRAVENOUS
Status: DISCONTINUED | OUTPATIENT
Start: 2017-03-02 | End: 2017-03-02 | Stop reason: HOSPADM

## 2017-03-02 RX ORDER — ASPIRIN 81 MG/1
81 TABLET ORAL DAILY
Status: DISCONTINUED | OUTPATIENT
Start: 2017-03-02 | End: 2017-03-02 | Stop reason: HOSPADM

## 2017-03-02 RX ORDER — PROTAMINE SULFATE 10 MG/ML
1-5 INJECTION, SOLUTION INTRAVENOUS
Status: DISCONTINUED | OUTPATIENT
Start: 2017-03-02 | End: 2017-03-02 | Stop reason: HOSPADM

## 2017-03-02 RX ORDER — DIPHENHYDRAMINE HYDROCHLORIDE 50 MG/ML
25-50 INJECTION INTRAMUSCULAR; INTRAVENOUS
Status: DISCONTINUED | OUTPATIENT
Start: 2017-03-02 | End: 2017-03-02 | Stop reason: HOSPADM

## 2017-03-02 RX ORDER — ENALAPRILAT 1.25 MG/ML
1.25-2.5 INJECTION INTRAVENOUS
Status: DISCONTINUED | OUTPATIENT
Start: 2017-03-02 | End: 2017-03-02 | Stop reason: HOSPADM

## 2017-03-02 RX ORDER — ADENOSINE 3 MG/ML
12-12000 INJECTION, SOLUTION INTRAVENOUS
Status: DISCONTINUED | OUTPATIENT
Start: 2017-03-02 | End: 2017-03-02 | Stop reason: HOSPADM

## 2017-03-02 RX ORDER — IOPAMIDOL 755 MG/ML
150 INJECTION, SOLUTION INTRAVASCULAR ONCE
Status: COMPLETED | OUTPATIENT
Start: 2017-03-02 | End: 2017-03-02

## 2017-03-02 RX ORDER — NIFEDIPINE 10 MG/1
10 CAPSULE ORAL
Status: DISCONTINUED | OUTPATIENT
Start: 2017-03-02 | End: 2017-03-02 | Stop reason: HOSPADM

## 2017-03-02 RX ORDER — SODIUM NITROPRUSSIDE 25 MG/ML
100-200 INJECTION INTRAVENOUS
Status: DISCONTINUED | OUTPATIENT
Start: 2017-03-02 | End: 2017-03-02 | Stop reason: HOSPADM

## 2017-03-02 RX ORDER — DOPAMINE HYDROCHLORIDE 160 MG/100ML
2-20 INJECTION, SOLUTION INTRAVENOUS CONTINUOUS PRN
Status: DISCONTINUED | OUTPATIENT
Start: 2017-03-02 | End: 2017-03-02 | Stop reason: HOSPADM

## 2017-03-02 RX ORDER — EPTIFIBATIDE 2 MG/ML
2 INJECTION, SOLUTION INTRAVENOUS CONTINUOUS PRN
Status: DISCONTINUED | OUTPATIENT
Start: 2017-03-02 | End: 2017-03-02 | Stop reason: HOSPADM

## 2017-03-02 RX ORDER — NITROGLYCERIN 5 MG/ML
100-200 VIAL (ML) INTRAVENOUS
Status: DISCONTINUED | OUTPATIENT
Start: 2017-03-02 | End: 2017-03-02 | Stop reason: HOSPADM

## 2017-03-02 RX ORDER — HYDRALAZINE HYDROCHLORIDE 20 MG/ML
10-20 INJECTION INTRAMUSCULAR; INTRAVENOUS
Status: DISCONTINUED | OUTPATIENT
Start: 2017-03-02 | End: 2017-03-02 | Stop reason: HOSPADM

## 2017-03-02 RX ORDER — ASPIRIN 325 MG
325 TABLET ORAL
Status: DISCONTINUED | OUTPATIENT
Start: 2017-03-02 | End: 2017-03-02 | Stop reason: HOSPADM

## 2017-03-02 RX ADMIN — ASPIRIN 81 MG: 81 TABLET, COATED ORAL at 08:25

## 2017-03-02 RX ADMIN — MIDAZOLAM HYDROCHLORIDE 1 MG: 1 INJECTION, SOLUTION INTRAMUSCULAR; INTRAVENOUS at 08:54

## 2017-03-02 RX ADMIN — FENTANYL CITRATE 50 MCG: 50 INJECTION, SOLUTION INTRAMUSCULAR; INTRAVENOUS at 08:46

## 2017-03-02 RX ADMIN — FENTANYL CITRATE 25 MCG: 50 INJECTION, SOLUTION INTRAMUSCULAR; INTRAVENOUS at 08:54

## 2017-03-02 RX ADMIN — MIDAZOLAM HYDROCHLORIDE 2 MG: 1 INJECTION, SOLUTION INTRAMUSCULAR; INTRAVENOUS at 08:47

## 2017-03-02 RX ADMIN — IOPAMIDOL 150 ML: 755 INJECTION, SOLUTION INTRAVASCULAR at 09:30

## 2017-03-02 RX ADMIN — FENTANYL CITRATE 50 MCG: 50 INJECTION, SOLUTION INTRAMUSCULAR; INTRAVENOUS at 08:39

## 2017-03-02 RX ADMIN — NITROGLYCERIN 0.4 MG: 0.4 TABLET SUBLINGUAL at 08:29

## 2017-03-02 RX ADMIN — LIDOCAINE HYDROCHLORIDE 100 MG: 10 INJECTION, SOLUTION EPIDURAL; INFILTRATION; INTRACAUDAL; PERINEURAL at 08:46

## 2017-03-02 RX ADMIN — MIDAZOLAM HYDROCHLORIDE 0.5 MG: 1 INJECTION, SOLUTION INTRAMUSCULAR; INTRAVENOUS at 09:02

## 2017-03-02 RX ADMIN — FENTANYL CITRATE 25 MCG: 50 INJECTION, SOLUTION INTRAMUSCULAR; INTRAVENOUS at 09:02

## 2017-03-02 RX ADMIN — SODIUM CHLORIDE: 9 INJECTION, SOLUTION INTRAVENOUS at 07:25

## 2017-03-02 NOTE — IP AVS SNAPSHOT
[place] [address]    [place] [address]    Phone:  [phone]                                       After Visit Summary   3/2/2017    Marli Nguyen    MRN: [id number]           After Visit Summary Signature Page     I have received my discharge instructions, and my questions have been answered. I have discussed any challenges I see with this plan with the nurse or doctor.    ..........................................................................................................................................  Patient/Patient Representative Signature      ..........................................................................................................................................  Patient Representative Print Name and Relationship to Patient    ..................................................               ................................................  Date                                            Time    ..........................................................................................................................................  Reviewed by Signature/Title    ...................................................              ..............................................  Date                                                            Time

## 2017-03-02 NOTE — PROGRESS NOTES
REFERRING PHYSICIAN:  Dr. Aixa Brantley.      HISTORY OF PRESENT ILLNESS:  Ms. Nguyen is a very pleasant 56-year-old female with a history of coronary artery disease, previous 2-vessel CABG in 2006 with a LIMA graft to the LAD and vein graft to an obtuse marginal 2.  She had direct left main stenting in 2013.  She is here for a followup visit today.  She has had recurrent symptoms of chest discomfort with radiation down her left arm and into her jaw.  She was seen at urgent care in [place] at the beginning of this month and underwent some testing including blood work with troponin and electrocardiogram.  I was able to review these with her today.  She had normal cardiac enzymes, and her EKG was fairly unremarkable.  She was asked to follow up in clinic today.  Of note, she has had much difficulty controlling her thyroid.  They did check a TSH at that time and it was quite low at 0.121.  She has had titration of her thyroid medication recently but notes that she does have chest pain with the higher dose, and therefore, it sounds like they did back off on this dose somewhat.  She has exertional symptoms, but her symptoms also are precipitated by emotional stress and she does endorse quite a bit of stress at work currently that is aggravating her condition.  She did have an episode of rest pain this morning.  She took 3 sublingual nitroglycerin spaced several minutes apart with ultimate relief of her symptoms.      PHYSICAL EXAMINATION:   VITAL SIGNS:  On exam today, her blood pressure is 118/74, pulse is 76, weight 188 pounds with a body mass index of 33.   NECK:  Carotid upstrokes seem brisk.  I do not appreciate carotid bruits.   CARDIOVASCULAR:  Tones are regular.  I do not hear a murmur, gallop or rub.   LUNGS:  Clear posteriorly.  She has diminished pulse noted on the left radial as compared to her right radial, and therefore I did auscultate the upper chest area around her subclavian artery, and I do hear a  soft murmur here, so there is concern that there may be some subclavian artery stenosis.   EXTREMITIES:  She has no peripheral edema.      ASSESSMENT AND PLAN:  In summary, Ms. Nguyen is a very pleasant 56-year-old female with a history of coronary disease, previous 2-vessel CABG with the LIMA graft to LAD and vein graft to obtuse marginal.  She has direct left main stenting as well.  She is having symptoms highly suggestive of unstable angina with an episode of rest pain this morning that was relieved after 3 sublingual nitroglycerin.  Her picture, of course, is complicated by a trip that she has planned on Saturday out of the country.  I feel very uncomfortable with her continuing with this trip without ischemic evaluation.  I do not feel a stress test would be the best option given that she has had previous left main stenting and bypass surgery.  If she should have a stenosis at that left main stent, she may end up having what is perceived as balanced ischemia or a normal stress test with nuclear perfusion imaging.  I would prefer that we define her anatomy with coronary angiogram, and I explained this to her today.  In addition to this, I would like to evaluate her subclavian artery.  She does have a LIMA graft to the LAD, and certainly subclavian artery stenosis is of concern here too based upon the bruit I auscultate on exam in the left upper chest area.  I did explain the risks, benefits and alternatives about the angiogram.  She is well skilled in this and has had it several times and does not have any questions or concerns and is willing to proceed at this time.  We talked about possibly increasing her beta blocker to 50 mg twice daily, especially since her symptoms do seem to be catacholmine driven with symptoms with escalated thyroid supplementation and also emotional stress.  We will certainly consider this, but again I would like to do an ischemic evaluation with coronary angiogram and see just what we  are dealing with here.  I will have her follow up with the results of the above testing once it is complete.  Please feel free to contact me with any questions you have in regards to her care.      cc:   [place] [address]         ZANE ROLON DO             D: 2017 13:13   T: 2017 22:36   MT: MARTHA      Name:     AIYANA BETH   MRN:      [id number]        Account:      [id number]   :      1960           Service Date: 2017      Document: [id number]

## 2017-03-02 NOTE — PROGRESS NOTES
1130 Pt up et ambulated in hallway et voided in bathroom. Tolerated fluids et crackers.  in room et will drive her home. Right groin site CDI; no bleeding or hematoma noted.  1210 Discharge instructions reviewed with pt et spouse. They have no further questions or concerns. Pt meets discharge criteria. Right groin site CDI; no bleeding or hematoma noted. All personal belongings returned to pt.  Pt taken via wheelchair to car et her spouse will drive her home.

## 2017-03-02 NOTE — DISCHARGE INSTRUCTIONS
Cardiac Angiogram Discharge Instructions - Femoral    After you go home:      Have an adult stay with you until tomorrow.    Drink extra fluids for 2 days.    You may resume your normal diet.    No smoking       For 24 hours - due to the sedation you received:    Relax and take it easy.    Do NOT make any important or legal decisions.    Do NOT drive or operate machines at home or at work.    Do NOT drink alcohol.    Care of Groin Puncture Site:      For the first 24 hrs - check the puncture site every 1-2 hours while awake.    For 2 days, when you cough, sneeze, laugh or move your bowels, hold your hand over the puncture site and press firmly.    Remove the bandaid after 24 hours. If there is minor oozing, apply another bandaid and remove it after 12 hours.    It is normal to have a small bruise or pea size lump at the site.    You may shower tomorrow. Do NOT take a bath, or use a hot tub or pool for at least 3 days. Do NOT scrub the site. Do not use lotion or powder near the puncture site.    Activity:            For 2 days:    No stooping or squatting    Do NOT do any heavy activity such as exercise, lifting, or straining.     No housework, yard work or any activity that make you sweat    Do NOT lift more than 10 pounds    Bleeding:      If you start bleeding from the site in your groin, lie down flat and press firmly on/above the site for 10 minutes.     Once bleeding stops, lay flat for 2 hours.     Call [facility] as soon as you can.       Call 911 right away if you have heavy bleeding or bleeding that does not stop.      Medicines:      If you are taking antiplatelet medications such as Plavix, Brilinta or Effient, do not stop taking it until you talk to your cardiologist.        If you are on Metformin (Glucophage), do not restart it until you have blood tests (within 2 to 3 days after discharge).  After you have your blood drawn, you may restart the Metformin.    Take your medications, including blood  thinners, unless your provider tells you not to.  If you take Coumadin (Warfarin), have your INR checked by your provider in  3-5 days. Call your clinic to schedule this.    If you have stopped any other medicines, check with your provider about when to restart them.    Follow Up Appointments:      Follow up with [facility] Nurse Practitioner at [facility] of patient preference in 7-10 days.    Call the clinic if:      You have increased pain or a large or growing hard lump around the site.    The site is red, swollen, hot or tender.    Blood or fluid is draining from the site.    You have chills or a fever greater than 101 F (38 C).    Your leg turns feels numb, cool or changes color.    You have hives, a rash or unusual itching.    New pain in the back or belly that you cannot control with Tylenol.    Any questions or concerns.          [facility] at [place]:    [phone] [facility] (7 days a week)

## 2017-03-02 NOTE — IP AVS SNAPSHOT
MRN:[id number]                      After Visit Summary   3/2/2017    Marli Nguyen    MRN: [id number]           Visit Information        Department      3/2/2017  6:27 AM [facility]          Review of your medicines      UNREVIEWED medicines. Ask your doctor about these medicines        Dose / Directions    amLODIPine 5 MG tablet   Commonly known as:  NORVASC   Used for:  Coronary artery disease involving native coronary artery of native heart without angina pectoris        Take 5 mg in AM, take 1/2 tab-2.5 mg in PM   Quantity:  120 tablet   Refills:  2       arginine 500 MG tablet        Dose:  500 mg   Take 500 mg by mouth 2 times daily   Refills:  0       aspirin 81 MG EC tablet        Dose:  162 mg   Take 162 mg by mouth At Bedtime   Refills:  0       CALCIUM-VITAMIN D PO        Dose:  1 tablet   Take 1 tablet by mouth every evening (strength unknown, does note that calcium is 800 mg)   Refills:  0       CYTOMEL 5 MCG tablet   Generic drug:  liothyronine        Dose:  5 mcg   Take 5 mcg by mouth daily   Refills:  0       MAGNESIUM OXIDE PO        Dose:  200 mg   Take 200 mg by mouth every evening   Refills:  0       metoprolol 25 MG tablet   Commonly known as:  LOPRESSOR   Used for:  Coronary artery disease involving native coronary artery of native heart with angina pectoris (H)        Dose:  25 mg   Take 1 tablet (25 mg) by mouth 2 times daily   Quantity:  180 tablet   Refills:  2       OMEGA-3 FISH OIL PO        Dose:  2 g   Take 2 g by mouth 2 times daily   Refills:  0       PROLIA 60 MG/ML Soln injection   Generic drug:  denosumab        Dose:  60 mg   Inject 60 mg Subcutaneous every 6 months   Refills:  0       rosuvastatin 20 MG tablet   Commonly known as:  CRESTOR   Used for:  Coronary artery disease involving native coronary artery of native heart without angina pectoris        Dose:  20 mg   Take 1 tablet (20 mg) by mouth daily   Quantity:  90 tablet    Refills:  2       SYNTHROID PO        Dose:  88 mcg   Take 88 mcg by mouth   Refills:  0       VITAMIN D (CHOLECALCIFEROL) PO        Dose:  1000 Units   Take 1,000 Units by mouth every evening   Refills:  0         CONTINUE these medicines which have NOT CHANGED        Dose / Directions    COENZYME Q-10 PO        Dose:  100 mg   Take 100 mg by mouth daily   Refills:  0       isosorbide mononitrate 60 MG 24 hr tablet   Commonly known as:  IMDUR   Used for:  CAD (coronary artery disease)        Dose:  60 mg   Take 1 tablet (60 mg) by mouth daily   Quantity:  90 tablet   Refills:  2       nitroglycerin 0.4 MG sublingual tablet   Commonly known as:  NITROSTAT   Used for:  Unstable angina (H), Coronary artery disease involving native coronary artery of native heart without angina pectoris        Dose:  0.4 mg   Place 1 tablet (0.4 mg) under the tongue every 5 minutes as needed   Quantity:  25 tablet   Refills:  3                Protect others around you: Learn how to safely use, store and throw away your medicines at www.disposemymeds.org.         Follow-ups after your visit        Your next 10 appointments already scheduled     Mar 15, 2017 12:30 PM CDT   Return Visit with Shanel Abebe PA-C   [facility] AT [place] ([facility])    [address] [place] [id number] [phone]               Care Instructions        After Care Instructions     Discharge Instructions - IF on Metformin (Glucophage or Glucovance) or Metformin containing medications       IF on Metformin (Glucophage or Glucovance) or Metformin containing medications , schedule a Basic Metabolic Panel at [facility] or Primary Clinic in 48 - 72 hours post procedure and PRIOR TO resuming the Metformin or Metformin containing medications.  Hold Metformin (Glucophage or Glucovance) or Metformin containing medications until after the Basic Metabolic Panel on the 2nd or 3rd day following the procedure.   May resume after blood draw is complete.                  Further instructions from your care team       Cardiac Angiogram Discharge Instructions - Femoral    After you go home:      Have an adult stay with you until tomorrow.    Drink extra fluids for 2 days.    You may resume your normal diet.    No smoking       For 24 hours - due to the sedation you received:    Relax and take it easy.    Do NOT make any important or legal decisions.    Do NOT drive or operate machines at home or at work.    Do NOT drink alcohol.    Care of Groin Puncture Site:      For the first 24 hrs - check the puncture site every 1-2 hours while awake.    For 2 days, when you cough, sneeze, laugh or move your bowels, hold your hand over the puncture site and press firmly.    Remove the bandaid after 24 hours. If there is minor oozing, apply another bandaid and remove it after 12 hours.    It is normal to have a small bruise or pea size lump at the site.    You may shower tomorrow. Do NOT take a bath, or use a hot tub or pool for at least 3 days. Do NOT scrub the site. Do not use lotion or powder near the puncture site.    Activity:            For 2 days:    No stooping or squatting    Do NOT do any heavy activity such as exercise, lifting, or straining.     No housework, yard work or any activity that make you sweat    Do NOT lift more than 10 pounds    Bleeding:      If you start bleeding from the site in your groin, lie down flat and press firmly on/above the site for 10 minutes.     Once bleeding stops, lay flat for 2 hours.     Call [facility] as soon as you can.       Call 911 right away if you have heavy bleeding or bleeding that does not stop.      Medicines:      If you are taking antiplatelet medications such as Plavix, Brilinta or Effient, do not stop taking it until you talk to your cardiologist.        If you are on Metformin (Glucophage), do not restart it until you have blood tests (within 2 to 3 days after discharge).  After  "you have your blood drawn, you may restart the Metformin.    Take your medications, including blood thinners, unless your provider tells you not to.  If you take Coumadin (Warfarin), have your INR checked by your provider in  3-5 days. Call your clinic to schedule this.    If you have stopped any other medicines, check with your provider about when to restart them.    Follow Up Appointments:      Follow up with [facility] Nurse Practitioner at [facility] Clinic of patient preference in 7-10 days.    Call the clinic if:      You have increased pain or a large or growing hard lump around the site.    The site is red, swollen, hot or tender.    Blood or fluid is draining from the site.    You have chills or a fever greater than 101 F (38 C).    Your leg turns feels numb, cool or changes color.    You have hives, a rash or unusual itching.    New pain in the back or belly that you cannot control with Tylenol.    Any questions or concerns.          [facility] at [place]:    [phone] [facility] (7 days a week)           Additional Information About Your Visit        [employer] Information     [employer] gives you secure access to your electronic health record. If you see a primary care provider, you can also send messages to your care team and make appointments. If you have questions, please call your primary care clinic.  If you do not have a primary care provider, please call [phone] and they will assist you.        Care EveryWhere ID     This is your Care EveryWhere ID. This could be used by other organizations to access your [place] medical records  [id number]        Your Vitals Were     Blood Pressure Pulse Temperature Respirations Height Weight    89/43 51 97.9  F (36.6  C) (Oral) 16 1.6 m (5' 3\") 85.7 kg (189 lb)    Pulse Oximetry BMI (Body Mass Index)                94% 33.48 kg/m2           Primary Care Provider Office Phone # Fax #    Aixa Brantley [phone]    "   Thank you!     Thank you for choosing [facility] for your care. Our goal is always to provide you with excellent care. Hearing back from our patients is one way we can continue to improve our services. Please take a few minutes to complete the written survey that you may receive in the mail after you visit with us. Thank you!             Medication List: This is a list of all your medications and when to take them. Check marks below indicate your daily home schedule. Keep this list as a reference.      Medications           Morning Afternoon Evening Bedtime As Needed    amLODIPine 5 MG tablet   Commonly known as:  NORVASC   Take 5 mg in AM, take 1/2 tab-2.5 mg in PM                                arginine 500 MG tablet   Take 500 mg by mouth 2 times daily                                aspirin 81 MG EC tablet   Take 162 mg by mouth At Bedtime   Last time this was given:  81 mg on 3/2/2017  8:25 AM                                CALCIUM-VITAMIN D PO   Take 1 tablet by mouth every evening (strength unknown, does note that calcium is 800 mg)                                COENZYME Q-10 PO   Take 100 mg by mouth daily                                CYTOMEL 5 MCG tablet   Take 5 mcg by mouth daily   Generic drug:  liothyronine                                isosorbide mononitrate 60 MG 24 hr tablet   Commonly known as:  IMDUR   Take 1 tablet (60 mg) by mouth daily                                MAGNESIUM OXIDE PO   Take 200 mg by mouth every evening                                metoprolol 25 MG tablet   Commonly known as:  LOPRESSOR   Take 1 tablet (25 mg) by mouth 2 times daily                                nitroglycerin 0.4 MG sublingual tablet   Commonly known as:  NITROSTAT   Place 1 tablet (0.4 mg) under the tongue every 5 minutes as needed   Last time this was given:  0.4 mg on 3/2/2017  8:29 AM                                OMEGA-3 FISH OIL PO   Take 2 g by mouth 2 times daily                                 PROLIA 60 MG/ML Soln injection   Inject 60 mg Subcutaneous every 6 months   Generic drug:  denosumab                                rosuvastatin 20 MG tablet   Commonly known as:  CRESTOR   Take 1 tablet (20 mg) by mouth daily                                SYNTHROID PO   Take 88 mcg by mouth                                VITAMIN D (CHOLECALCIFEROL) PO   Take 1,000 Units by mouth every evening

## 2017-03-03 ENCOUNTER — TELEPHONE (OUTPATIENT)
Dept: CARDIOLOGY | Facility: CLINIC | Age: 57
End: 2017-03-03

## 2017-03-03 DIAGNOSIS — I25.119 CORONARY ARTERY DISEASE INVOLVING NATIVE CORONARY ARTERY OF NATIVE HEART WITH ANGINA PECTORIS (H): ICD-10-CM

## 2017-03-03 LAB — INTERPRETATION ECG - MUSE: NORMAL

## 2017-03-03 RX ORDER — METOPROLOL TARTRATE 50 MG
50 TABLET ORAL 2 TIMES DAILY
Qty: 60 TABLET | Refills: 11 | Status: SHIPPED | OUTPATIENT
Start: 2017-03-03 | End: 2017-04-11

## 2017-03-03 NOTE — TELEPHONE ENCOUNTER
Reviewed cath report with Dr Sheppard from 3/2/17 (no intervention), and whether or not pt should increased her metoprolol to 50mg BID as discussed at OV. Pt called clinic yesterday and said she already increased it to the 50 mg BID on her own, and she's leaving for a cruise Sat and would need a new Rx sent. Per Dr Sheppard, ok to increase the metoprolol to 50 mg BID. Pt has ALFREDO f/u 3/15.

## 2017-03-15 ENCOUNTER — OFFICE VISIT (OUTPATIENT)
Dept: CARDIOLOGY | Facility: CLINIC | Age: 57
End: 2017-03-15
Payer: COMMERCIAL

## 2017-03-15 VITALS
DIASTOLIC BLOOD PRESSURE: 84 MMHG | WEIGHT: 191.3 LBS | HEART RATE: 56 BPM | SYSTOLIC BLOOD PRESSURE: 128 MMHG | HEIGHT: 64 IN | BODY MASS INDEX: 32.66 KG/M2

## 2017-03-15 DIAGNOSIS — I25.10 CORONARY ARTERY DISEASE INVOLVING NATIVE CORONARY ARTERY OF NATIVE HEART WITHOUT ANGINA PECTORIS: Primary | ICD-10-CM

## 2017-03-15 DIAGNOSIS — E78.5 HYPERLIPIDEMIA LDL GOAL <70: ICD-10-CM

## 2017-03-15 PROCEDURE — 99214 OFFICE O/P EST MOD 30 MIN: CPT | Performed by: PHYSICIAN ASSISTANT

## 2017-03-15 NOTE — MR AVS SNAPSHOT
After Visit Summary   3/15/2017    Marli Nguyen    MRN: [id number]           Patient Information     Date Of Birth          1960        Visit Information        Provider Department      3/15/2017 12:30 PM Shanel Abebe PA-C [facility] AT [place]        Care Instructions    Today's Plan:   1. Continue with current dose of Metoprolol.    2. Dr. Sheppard thought maybe your chest discomfort was catacholmine driven with symptoms with escalated thyroid supplementation and also emotional stress.    If you have questions or concerns please call my nurse at [phone].     Scheduling phone number: [phone]  Reminder: Please bring in all current medications, over the counter supplements and vitamin bottles to your next appointment.    It was a pleasure seeing you today!     Shanel Abebe  3/15/2017            Follow-ups after your visit        Who to contact     If you have questions or need follow up information about today's clinic visit or your schedule please contact [facility] directly at [phone].  Normal or non-critical lab and imaging results will be communicated to you by MyChart, letter or phone within 4 business days after the clinic has received the results. If you do not hear from us within 7 days, please contact the clinic through MyChart or phone. If you have a critical or abnormal lab result, we will notify you by phone as soon as possible.  Submit refill requests through [employer] or call your pharmacy and they will forward the refill request to us. Please allow 3 business days for your refill to be completed.          Additional Information About Your Visit        [employer] Information     [employer] gives you secure access to your electronic health record. If you see a primary care provider, you can also send messages to your care team and make appointments. If you have questions, please call your primary  "care clinic.  If you do not have a primary care provider, please call [phone] and they will assist you.        Care EveryWhere ID     This is your Care EveryWhere ID. This could be used by other organizations to access your [place] medical records  [id number]        Your Vitals Were     Pulse Height BMI (Body Mass Index)             56 1.613 m (5' 3.5\") 33.36 kg/m2          Blood Pressure from Last 3 Encounters:   03/15/17 128/84   03/02/17 124/66   03/01/17 118/74    Weight from Last 3 Encounters:   03/15/17 86.8 kg (191 lb 4.8 oz)   03/02/17 85.7 kg (189 lb)   03/01/17 85.7 kg (188 lb 14.4 oz)              Today, you had the following     No orders found for display       Primary Care Provider Office Phone # Fax #    Aixa Brantley [phone]       [facility] [address] [facility] [address]        Thank you!     Thank you for choosing [facility] AT [place]  for your care. Our goal is always to provide you with excellent care. Hearing back from our patients is one way we can continue to improve our services. Please take a few minutes to complete the written survey that you may receive in the mail after your visit with us. Thank you!             Your Updated Medication List - Protect others around you: Learn how to safely use, store and throw away your medicines at www.disposemymeds.org.          This list is accurate as of: 3/15/17  1:01 PM.  Always use your most recent med list.                   Brand Name Dispense Instructions for use    amLODIPine 5 MG tablet    NORVASC    120 tablet    Take 5 mg in AM, take 1/2 tab-2.5 mg in PM       arginine 500 MG tablet      Take 500 mg by mouth 2 times daily       aspirin 81 MG EC tablet      Take 162 mg by mouth At Bedtime       CALCIUM-VITAMIN D PO      Take 1 tablet by mouth every evening (strength unknown, does note that calcium is 800 mg)       COENZYME Q-10 PO      Take 100 mg by mouth " daily       CYTOMEL 5 MCG tablet   Generic drug:  liothyronine      Take 5 mcg by mouth daily       isosorbide mononitrate 60 MG 24 hr tablet    IMDUR    90 tablet    Take 1 tablet (60 mg) by mouth daily       MAGNESIUM OXIDE PO      Take 200 mg by mouth every evening       metoprolol 50 MG tablet    LOPRESSOR    60 tablet    Take 1 tablet (50 mg) by mouth 2 times daily       nitroglycerin 0.4 MG sublingual tablet    NITROSTAT    25 tablet    Place 1 tablet (0.4 mg) under the tongue every 5 minutes as needed       OMEGA-3 FISH OIL PO      Take 2 g by mouth 2 times daily       PROLIA 60 MG/ML Soln injection   Generic drug:  denosumab      Inject 60 mg Subcutaneous every 6 months       rosuvastatin 20 MG tablet    CRESTOR    90 tablet    Take 1 tablet (20 mg) by mouth daily       SYNTHROID PO      Take 88 mcg by mouth daily       VITAMIN D (CHOLECALCIFEROL) PO      Take 1,000 Units by mouth every evening

## 2017-03-15 NOTE — LETTER
3/15/2017    Aixa Brantley MD  [facility]   [address]  [facility] [address]    RE: Marli Nguyen       Dear Colleague,    I had the pleasure of seeing Marli Nguyen in the [facility].    PRIMARY CARDIOLOGIST:  Dr. Sheppard       REASON FOR CLINIC VISIT:  Post angiogram followup.      Marli is a delightful, 56-year-old female with a past medical history notable for CAD (CABG x2 back in 2006 with LIMA graft to the LAD and SVG to the OM2; more recently she had stenting to the left main in 2013), hyperlipidemia and Grave's disease (s/p radioactive iodine and now has hypothyroidism). Recently she has had much difficulty controlling her thyroid.  At one point her TSH was as low as 0.121 requiring significant up-titration of her levothyroxine. Patient unfortunately had chest discomfort with higher dose and this was ultimately decreased to a moderate dose.      Marli was recently seen by Dr. Sheppard for evaluation of 3-week history of chest discomfort with some radiation to Left arm.  This was exertional, and her chest discomfort was precipitated by emotional stress.  There was also progression of her chest discomfort with most recent episodes starting to occur at rest.  She had been taking up to 3 sublingual nitroglycerin to reach a complete resolution of her symptoms.      Given the patient's history of left main stenting as well as planned abroad trip in a few days, the best evaluation was felt to be a coronary angiogram.      This was done on 03/02/2017 with Interventional Cardiology Fellow, Dr. Marcel Alegria, under the supervision of Dr. David Blanchard.  Access was obtained through the right common femoral artery.  The patient was found to have 2 vessel coronary artery disease with a prior CABG to the LAD and left circumflex.  However, there was no current significant stenosis in the left main, LAD or left circumflex.  RCA was noted to be a small, nondominant  vessel without any significant lesions.  LV gram showed an ejection fraction of 55%.  LVEDP was 15 mmHg.      Of note, on exam, the patient was also noted to have a bruit in the left upper chest area with possible concern for subclavian artery stenosis.  There was no evidence of stenosis on angiogram.      Her symptoms were felt to be more catecholamine driven patient developed chest discomfort with escalation of thyroid supplementation and had reports emotional stress precipitating her symptoms. Her beta-blockade agent was up-titrated in efforts to blunt the catecholamine effect.  She was set up to follow up with me for reevaluation of her symptoms as well as discussion of the results from her recent coronary angiogram.      Marli presents to Cardiology Clinic today stating that she has complete resolution of her chest discomfort.  She is tolerating the increase in metoprolol.  Denies lightheadedness or dizziness. She has no new questions or concerns at this time.      CURRENT CARDIAC MEDICATIONS:   1.  Metoprolol tartrate 50 mg 2 times daily.   2.  Nitrostat 0.4 mg as needed for chest discomfort.   3.  Rosuvastatin 20 mg daily.   4.  Amlodipine 5 mg in a.m. and 2.5 mg in p.m.   5.  Isosorbide mononitrate 60 mg 1 time daily.   6.  Aspirin 81 mg 1 time daily.      The remainder of her social history and review of systems were reviewed and as are documented separately.      ALLERGIES:  Reviewed and as are documented separately.      VITAL SIGNS:  Blood pressure 128/84 mmHg, pulse 56 beats per minute, weight 191 pounds.      PHYSICAL EXAMINATION:   GENERAL:  NAD.   SKIN:  Warm and dry to touch.  Mild ecchymosis to right common femoral site without any evidence of hematoma.   HEAD:  Normocephalic    EYES:  Pupils equal and round.   ENT:  No pallor or cyanosis.  Dentition good.     NECK:  Carotid pulses are full and equal bilaterally.  Normal JVP, no carotid bruits.   RESPIRATORY:  Clear to auscultation bilaterally.    CARDIAC:  Regular rate and rhythm without murmurs, clicks or gallop.   ABDOMEN:  Soft.   VASCULAR:  Diminished left radial pulse, 2+ right radial pulse, 2+ dorsalis pedis pulses bilaterally.      ASSESSMENT AND PLAN:  Marli is a delightful, 56-year-old female who comes in for post angiogram followup.  Her past medical history is notable for CAD (with CABG back in 2006 with a LIMA to the LAD and SVG to OM2 as well as posterolateral artery; she received stenting to the left main in 2013), hyperlipidemia and Grave's disease (s/p radioactive iodine, now on levothyroxine therapy).  More recently she has had thyroid issues with TSH being very low.  With up titration of her levothyroxine, she has had chest discomfort, so she was placed on a lower dose.       Marli was seen by Dr. Sheppard recently for 3-4 weeks of chest discomfort that is exertional.  There is some radiation down to her left arm and to her jaw.  Given her history of left main stenting as well as the fact that she had plans on leaving the country in a few days, further evaluation was ultimately decided to be done with coronary angiography.  On exam, there was also a bruit that was auscultated in the left chest area raising suspicion for possible subclavian arterial stenosis.  Her coronary angiography was essentially normal.  It did not show any evidence of subclavian artery stenosis.  Her Toprol was increased to 50 mg twice daily, as her chest discomfort was thought to be more catecholamine driven given the fact that she has had chest discomfort with increased doses of levothyroxine and her chest discomfort was mostly precipitated by emotional stress.      Marli presents to clinic today stating that she has complete resolution of her chest discomfort.  She denies any lightheadedness or dizziness.  She denies any right groin pain.  On exam, I do not see any evidence of hematoma or bruit.  At this time, we will have her continue with the current dose  of metoprolol.      All of Marli's questions were answered to her satisfaction.      Thank you for allowing me to participate in the care of this delightful patient today.     Sincerely,    Shanel Abebe PA-C     [facility]

## 2017-03-15 NOTE — PATIENT INSTRUCTIONS
Today's Plan:   1. Continue with current dose of Metoprolol.    2. Dr. Sheppard thought maybe your chest discomfort was catacholmine driven with symptoms with escalated thyroid supplementation and also emotional stress.    If you have questions or concerns please call my nurse at [phone].     Scheduling phone number: [phone]  Reminder: Please bring in all current medications, over the counter supplements and vitamin bottles to your next appointment.    It was a pleasure seeing you today!     Shanel Abebe  3/15/2017

## 2017-03-16 NOTE — PROGRESS NOTES
HPI and Plan:   See dictation. Confirmation # [id number].    Orders this Visit:  No orders of the defined types were placed in this encounter.    No orders of the defined types were placed in this encounter.    There are no discontinued medications.      No diagnosis found.    CURRENT MEDICATIONS:  Current Outpatient Prescriptions   Medication Sig Dispense Refill     metoprolol (LOPRESSOR) 50 MG tablet Take 1 tablet (50 mg) by mouth 2 times daily 60 tablet 11     liothyronine (CYTOMEL) 5 MCG tablet Take 5 mcg by mouth daily       nitroglycerin (NITROSTAT) 0.4 MG sublingual tablet Place 1 tablet (0.4 mg) under the tongue every 5 minutes as needed 25 tablet 3     rosuvastatin (CRESTOR) 20 MG tablet Take 1 tablet (20 mg) by mouth daily 90 tablet 2     amLODIPine (NORVASC) 5 MG tablet Take 5 mg in AM, take 1/2 tab-2.5 mg in  tablet 2     isosorbide mononitrate (IMDUR) 60 MG 24 hr tablet Take 1 tablet (60 mg) by mouth daily 90 tablet 2     denosumab (PROLIA) 60 MG/ML SOLN Inject 60 mg Subcutaneous every 6 months       CALCIUM-VITAMIN D PO Take 1 tablet by mouth every evening (strength unknown, does note that calcium is 800 mg)       VITAMIN D, CHOLECALCIFEROL, PO Take 1,000 Units by mouth every evening       MAGNESIUM OXIDE PO Take 200 mg by mouth every evening       COENZYME Q-10 PO Take 100 mg by mouth daily       arginine 500 MG tablet Take 500 mg by mouth 2 times daily        Omega-3 Fatty Acids (OMEGA-3 FISH OIL PO) Take 2 g by mouth 2 times daily        aspirin 81 MG EC tablet Take 162 mg by mouth At Bedtime        Levothyroxine Sodium (SYNTHROID PO) Take 88 mcg by mouth daily          ALLERGIES     Allergies   Allergen Reactions     Bactrim [Sulfamethoxazole W/Trimethoprim]      rash     Erythromycin Cramps     Sulfa Drugs      Tape [Adhesive Tape]        PAST MEDICAL HISTORY:  Past Medical History   Diagnosis Date     Chronic left hip pain      Coronary artery disease      s/p CABG 2006 (LIMA to LAD and  saphneous vein graft to OM1 and OM3), and KERRI to left main in 2013     DM type 2 (diabetes mellitus, type 2) (H)      Gallstone pancreatitis 2012     Graves disease      s/p radioiodide therapy in 2001, now takes synthroid     Hyperlipidemia      Hypertension        PAST SURGICAL HISTORY:  Past Surgical History   Procedure Laterality Date     Lasik bilateral       Right thumb mass excision  2008     Cholecystectomy  4/12     Carpal tunnel release rt/lt       Dilation and curettage, hysteroscopy, ablate endometrium novasure, combined  6/28/2012     Procedure: COMBINED DILATION AND CURETTAGE, HYSTEROSCOPY, ABLATE ENDOMETRIUM NOVASURE;  DILATION AND CURETTAGE, HYSTEROSCOPY, ABLATE ENDOMETRIUM,pelvic exam under anesthesia;  Surgeon: Johnna Harley MD;  Location: [facility] OR     Laparoscopic hysterectomy total, bilateral salpingo-oophorectomy, combined  9/20/2012     Procedure: COMBINED LAPAROSCOPIC HYSTERECTOMY TOTAL, SALPINGO-OOPHORECTOMY;  LAPAROSCOPIC HYSTERECTOMY TOTAL, SALPINGO-OOPHORECTOMY;  Surgeon: Johnna Harley MD;  Location: [facility] OR     Carlotta teeth removal       Repair vaginal cuff  10/31/2012     Procedure: REPAIR VAGINAL CUFF;  Irrigation and repair of Vaginal Cuff;  Surgeon: Johnna Harley MD;  Location: [facility] OR     Coronary artery bypass  3/2006     LIMA to LAD, sequential SV grafts to OM1 and OM3     Coronary angiography adult order  11/2006     70% ostial left main lesion, 50-60% mid LAD stenosis prox. to LIMA insertion site, circumflex with widely patent saph vein graft into first obtuse marginal, tiny posterolateral branch occluded     Coronary angiography adult order  12/2006     attempted circ OM3 PTCA     Coronary angiography adult order  6/2013     KERRI to ostial left main, widely patent LIMA to LAD and saphenous vein graft     Coronary angiography adult order  1/2014     left main stent widely patent, LIMA to LAD and vein graft to first obtuse marginal patent     Coronary angiography adult order   "10/13/16     Left main:there is evidence of previous stent implantation in the left main with no evidence of restenosis. The distal and mid LAD are free of signigficant narrowing. The bypass graft to the marginal branch is widely patent. The study is unchanged since her last study on 11/14/2014.       FAMILY HISTORY:  History reviewed. No pertinent family history.    SOCIAL HISTORY:  Social History     Social History     Marital status:      Spouse name: N/A     Number of children: N/A     Years of education: N/A     Social History Main Topics     Smoking status: Never Smoker     Smokeless tobacco: Never Used     Alcohol use 0.0 oz/week     0 Standard drinks or equivalent per week      Comment: rare     Drug use: No     Sexual activity: Not Asked     Other Topics Concern     Caffeine Concern Yes     10 oz of coffee per day     Sleep Concern No     Weight Concern Yes     weight gain     Special Diet Yes     gluten free     Exercise Yes     walking 2 miles daily     Social History Narrative       Review of Systems:  Skin:        Eyes:  Positive for glasses  ENT:  Negative    Respiratory:  Negative    Cardiovascular:  Negative;chest pain;syncope or near-syncope;cyanosis;dizziness;lightheadedness;fatigue;palpitations;edema    Gastroenterology: Negative    Genitourinary:  not assessed    Musculoskeletal:  Negative    Neurologic:  Negative    Psychiatric:  Positive for excessive stress  Heme/Lymph/Imm:  Negative    Endocrine:  Positive for thyroid disorder    Physical Exam:  Vitals: /84 (BP Location: Left arm, Cuff Size: Adult Large)  Pulse 56  Ht 1.613 m (5' 3.5\")  Wt 86.8 kg (191 lb 4.8 oz)  BMI 33.36 kg/m2   Please refer to dictation for physical exam    Recent Lab Results:  LIPID RESULTS:  Lab Results   Component Value Date    CHOL 138 10/13/2016    HDL 59 10/13/2016    LDL 58 10/13/2016    TRIG 106 10/13/2016    CHOLHDLRATIO 2.6 01/14/2014       LIVER ENZYME RESULTS:  Lab Results   Component Value Date "    AST 38 11/17/2015    ALT 47 11/17/2015       CBC RESULTS:  Lab Results   Component Value Date    WBC 5.6 03/02/2017    RBC 4.82 03/02/2017    HGB 14.7 03/02/2017    HCT 43.9 03/02/2017    MCV 91 03/02/2017    MCH 30.5 03/02/2017    MCHC 33.5 03/02/2017    RDW 13.7 03/02/2017     03/02/2017     05/25/2016       BMP RESULTS:  Lab Results   Component Value Date     03/02/2017    POTASSIUM 4.7 03/02/2017    CHLORIDE 109 03/02/2017    CO2 29 03/02/2017    ANIONGAP 5 03/02/2017     (H) 03/02/2017    BUN 22 03/02/2017    CR 0.86 03/02/2017    GFRESTIMATED 67 03/02/2017    GFRESTBLACK 82 03/02/2017    ANNAMARIE 9.3 03/02/2017        A1C RESULTS:  Lab Results   Component Value Date    A1C 5.9 11/16/2015       INR RESULTS:  Lab Results   Component Value Date    INR 0.92 03/02/2017    INR 0.98 10/28/2014         Shanel Abebe PA-C   3/15/2017  Pager: [phone]

## 2017-03-16 NOTE — PROGRESS NOTES
PRIMARY CARDIOLOGIST:  Dr. Sheppard       REASON FOR CLINIC VISIT:  Post angiogram followup.      HISTORY OF PRESENT ILLNESS:  Marli is a delightful, 56-year-old female with a past medical history notable for CAD (CABG x2 back in 2006 with LIMA graft to the LAD and SVG to the OM2; more recently she had stenting to the left main in 2013), hyperlipidemia and Grave's disease (s/p radioactive iodine and now has hypothyroidism). Recently she has had much difficulty controlling her thyroid.  At one point her TSH was as low as 0.121 requiring significant up-titration of her levothyroxine. Patient unfortunately had chest discomfort with higher dose and this was ultimately decreased to a moderate dose.      Marli was recently seen by Dr. Sheppard for evaluation of 3-week history of chest discomfort with some radiation to Left arm.  This was exertional, and her chest discomfort was precipitated by emotional stress.  There was also progression of her chest discomfort with most recent episodes starting to occur at rest.  She had been taking up to 3 sublingual nitroglycerin to reach a complete resolution of her symptoms.      Given the patient's history of left main stenting as well as planned abroad trip in a few days, the best evaluation was felt to be a coronary angiogram.      This was done on 03/02/2017 with Interventional Cardiology Fellow, Dr. Marcel Alegria, under the supervision of Dr. David Blanchard.  Access was obtained through the right common femoral artery.  The patient was found to have 2 vessel coronary artery disease with a prior CABG to the LAD and left circumflex.  However, there was no current significant stenosis in the left main, LAD or left circumflex.  RCA was noted to be a small, nondominant vessel without any significant lesions.  LV gram showed an ejection fraction of 55%.  LVEDP was 15 mmHg.      Of note, on exam, the patient was also noted to have a bruit in the left upper chest area with possible concern  for subclavian artery stenosis.  There was no evidence of stenosis on angiogram.      Her symptoms were felt to be more catecholamine driven patient developed chest discomfort with escalation of thyroid supplementation and had reports emotional stress precipitating her symptoms. Her beta-blockade agent was up-titrated in efforts to blunt the catecholamine effect.  She was set up to follow up with me for reevaluation of her symptoms as well as discussion of the results from her recent coronary angiogram.      Marli presents to Cardiology Clinic today stating that she has complete resolution of her chest discomfort.  She is tolerating the increase in metoprolol.  Denies lightheadedness or dizziness. She has no new questions or concerns at this time.      CURRENT CARDIAC MEDICATIONS:   1.  Metoprolol tartrate 50 mg 2 times daily.   2.  Nitrostat 0.4 mg as needed for chest discomfort.   3.  Rosuvastatin 20 mg daily.   4.  Amlodipine 5 mg in a.m. and 2.5 mg in p.m.   5.  Isosorbide mononitrate 60 mg 1 time daily.   6.  Aspirin 81 mg 1 time daily.      The remainder of her social history and review of systems were reviewed and as are documented separately.      ALLERGIES:  Reviewed and as are documented separately.      VITAL SIGNS:  Blood pressure 128/84 mmHg, pulse 56 beats per minute, weight 191 pounds.      PHYSICAL EXAMINATION:   GENERAL:  NAD.   SKIN:  Warm and dry to touch.  Mild ecchymosis to right common femoral site without any evidence of hematoma.   HEAD:  Normocephalic    EYES:  Pupils equal and round.   ENT:  No pallor or cyanosis.  Dentition good.     NECK:  Carotid pulses are full and equal bilaterally.  Normal JVP, no carotid bruits.   RESPIRATORY:  Clear to auscultation bilaterally.   CARDIAC:  Regular rate and rhythm without murmurs, clicks or gallop.   ABDOMEN:  Soft.   VASCULAR:  Diminished left radial pulse, 2+ right radial pulse, 2+ dorsalis pedis pulses bilaterally.      ASSESSMENT AND PLAN:   Marli is a delightful, 56-year-old female who comes in for post angiogram followup.  Her past medical history is notable for CAD (with CABG back in 2006 with a LIMA to the LAD and SVG to OM2 as well as posterolateral artery; she received stenting to the left main in 2013), hyperlipidemia and Grave's disease (s/p radioactive iodine, now on levothyroxine therapy).  More recently she has had thyroid issues with TSH being very low.  With up titration of her levothyroxine, she has had chest discomfort, so she was placed on a lower dose.       Marli was seen by Dr. Sheppard recently for 3-4 weeks of chest discomfort that is exertional.  There is some radiation down to her left arm and to her jaw.  Given her history of left main stenting as well as the fact that she had plans on leaving the country in a few days, further evaluation was ultimately decided to be done with coronary angiography.  On exam, there was also a bruit that was auscultated in the left chest area raising suspicion for possible subclavian arterial stenosis.  Her coronary angiography was essentially normal.  It did not show any evidence of subclavian artery stenosis.  Her Toprol was increased to 50 mg twice daily, as her chest discomfort was thought to be more catecholamine driven given the fact that she has had chest discomfort with increased doses of levothyroxine and her chest discomfort was mostly precipitated by emotional stress.      Marli presents to clinic today stating that she has complete resolution of her chest discomfort.  She denies any lightheadedness or dizziness.  She denies any right groin pain.  On exam, I do not see any evidence of hematoma or bruit.  At this time, we will have her continue with the current dose of metoprolol.      All of Marli's questions were answered to her satisfaction.      Thank you for allowing me to participate in the care of this delightful patient today.         MOLLY VALDEZ:  2017 09:02   T: 2017 14:00   MT: tiana      Name:     AIYANA BETH   MRN:      [id number]        Account:      [id number]   :      1960           Service Date: 03/15/2017      Document: [id number]

## 2017-03-20 DIAGNOSIS — I25.85 CARDIAC MICROVASCULAR DISEASE: Primary | ICD-10-CM

## 2017-04-11 DIAGNOSIS — I25.119 CORONARY ARTERY DISEASE INVOLVING NATIVE CORONARY ARTERY OF NATIVE HEART WITH ANGINA PECTORIS (H): ICD-10-CM

## 2017-04-11 RX ORDER — METOPROLOL TARTRATE 50 MG
50 TABLET ORAL 2 TIMES DAILY
Qty: 180 TABLET | Refills: 3 | Status: SHIPPED | OUTPATIENT
Start: 2017-04-11 | End: 2017-06-07

## 2017-06-07 ENCOUNTER — TELEPHONE (OUTPATIENT)
Dept: CARDIOLOGY | Facility: CLINIC | Age: 57
End: 2017-06-07

## 2017-06-07 DIAGNOSIS — I25.119 CORONARY ARTERY DISEASE INVOLVING NATIVE CORONARY ARTERY OF NATIVE HEART WITH ANGINA PECTORIS (H): ICD-10-CM

## 2017-06-07 RX ORDER — METOPROLOL TARTRATE 50 MG
25 TABLET ORAL 2 TIMES DAILY
Qty: 180 TABLET | Refills: 3 | COMMUNITY
Start: 2017-06-07 | End: 2018-02-20

## 2017-06-07 NOTE — TELEPHONE ENCOUNTER
Pt called to report that she has been taking the wrong dose of amlodipine for the past 3 months as her mail order pharmacy previously sent wrong size tablet. Pt has been taking amlodipine 2.5 mg in the am and 1.25 mg in the pm. Pt wondering if she should resume previous dose. She is concerned because her metoprolol was increased to 50 mg BID back in 3/2017 when she was having chest pain issues because pain was thought to be due to possible increased catecholamine response. Pt has been feeling very sluggish with the increased dose of metoprolol and wonders if she should go back to 25 mg BID now that she is going to go back on her increased amlodipine dose. Pt said she has not had any further issues with chest pain since March.*I reviewed with . She said pt should go back to taking amlodipine 5 mg in the am and 2.5 mg in the pm, and can try decreasing metoprolol to previous dose of 25 mg BID. I called pt with instructions. She will monitor bp with these changes and notify us if any concerns about bp, or if she has any further episodes of chest pain. Dylan HASSAN

## 2017-06-17 ENCOUNTER — HEALTH MAINTENANCE LETTER (OUTPATIENT)
Age: 57
End: 2017-06-17

## 2017-07-03 DIAGNOSIS — I25.10 CORONARY ARTERY DISEASE INVOLVING NATIVE CORONARY ARTERY OF NATIVE HEART WITHOUT ANGINA PECTORIS: ICD-10-CM

## 2017-07-03 RX ORDER — AMLODIPINE BESYLATE 5 MG/1
TABLET ORAL
Qty: 135 TABLET | Refills: 1 | Status: SHIPPED | OUTPATIENT
Start: 2017-07-03 | End: 2017-08-24

## 2017-08-22 ENCOUNTER — DOCUMENTATION ONLY (OUTPATIENT)
Dept: LAB | Facility: CLINIC | Age: 57
End: 2017-08-22

## 2017-08-22 ENCOUNTER — TRANSFERRED RECORDS (OUTPATIENT)
Dept: HEALTH INFORMATION MANAGEMENT | Facility: CLINIC | Age: 57
End: 2017-08-22

## 2017-08-23 ENCOUNTER — TELEPHONE (OUTPATIENT)
Dept: CARDIOLOGY | Facility: CLINIC | Age: 57
End: 2017-08-23

## 2017-08-23 NOTE — PROGRESS NOTES
I received a call by Dr. Fercho warner.    Ms. Nguyen has had CP off and on for 3 weeks, but relatively continuous central CP x 24 hrs.  Negative trop tonight, abnormal but unchanged ECG.     Please speak to Dr. Sheppard's RN lena stevens on Wed am and arrange for the patient to be seen on Wednesday or Thursday.  AL[facility]O would be OK.     Thx, DI

## 2017-08-23 NOTE — TELEPHONE ENCOUNTER
Received voicemail from pt explaining that she saw her PMD yesterday because of chest discomfort, pt had negative troponins and an unchanged EKG. PMD spoke with cardiologist on call (Dr. Vance) and informed him of pt's labs. Dr. Vance recommended that pt be seen by ALFREDO either today or tomorrow. Scheduled appt for pt at 10:50am on 8/24/17, pt will be going out of town tomorrow evening for 1.5 weeks. Returned call to pt and explained Dr. Vance recommendations and pt agreed to appt. Asked pt more about chest discomfort and she explained that it's be ongoing throughout August and she has had to take 3 Nitro to make it go away but has not had any Nitro in the past 2 days. Pt explained that it comes and goes with or without exertion. Pt is aware to go to ER if chest discomfort worsens before appt tomorrow. Requested recent medical records from PMD's office.

## 2017-08-24 ENCOUNTER — OFFICE VISIT (OUTPATIENT)
Dept: CARDIOLOGY | Facility: CLINIC | Age: 57
End: 2017-08-24
Payer: COMMERCIAL

## 2017-08-24 VITALS
HEART RATE: 50 BPM | WEIGHT: 195 LBS | DIASTOLIC BLOOD PRESSURE: 70 MMHG | HEIGHT: 64 IN | SYSTOLIC BLOOD PRESSURE: 127 MMHG | BODY MASS INDEX: 33.29 KG/M2

## 2017-08-24 DIAGNOSIS — I25.10 CORONARY ARTERY DISEASE INVOLVING NATIVE CORONARY ARTERY OF NATIVE HEART WITHOUT ANGINA PECTORIS: ICD-10-CM

## 2017-08-24 PROCEDURE — 99214 OFFICE O/P EST MOD 30 MIN: CPT | Performed by: NURSE PRACTITIONER

## 2017-08-24 RX ORDER — AMLODIPINE BESYLATE 5 MG/1
5 TABLET ORAL 2 TIMES DAILY
Refills: 1 | COMMUNITY
Start: 2017-08-24 | End: 2017-11-29

## 2017-08-24 NOTE — LETTER
8/24/2017    Aixa Brantley MD  [facility]  [address] [address]    RE: Marli Nguyen       Dear Colleague,    I had the pleasure of seeing Marli Nguyen in the [facility].    HPI and Plan:   Ms. Nguyen is a very pleasant 56-year-old female with a history of coronary artery disease, previous 2-vessel CABG in 2006 with a LIMA graft to the LAD and vein graft to an obtuse marginal 2.  She had direct left main stenting in 2013.  She is a past medical history of hypothyroidism.  I am asked to see her today after she went to her primary care office yesterday with symptoms of chest pain.  Her troponins there were negative, her BNP was trivial, her CK was normal, her EKG did not demonstrate any ST elevation, she was in a sinus bradycardia.    She last saw Dr. Sheppard in March of this year following a visit to primary care clinic for chest pain with radiation down her left arm and into her jaw, workup at her PCP showed normal cardiac enzymes and unremarkable EKG.  Her symptoms were exertional and Dr. Sheppard felt, given her complex cardiac history, a coronary angiogram would be the best way to evaluate progression of her disease.    Her coronary angiogram in March failed to demonstrate any flow limiting lesions.  Her LV gram showed EF of 55%.  Her left main was moderately calcified with mild luminal irregularities, her LAD showed minimal luminal irregularities, her left circumflex was dominant with minimal luminal irregularities, she had a small nondominant right coronary arteries with minimal luminal irregularities.  Her LIMA graft was enteric but patent, her saphenous vein graft to OM1 was widely patent with a prominent valve mid graft.  The graft provides competitive flow to the entire left circumflex and LAD circulation.  There was no evidence of subclavian stenosis.  No intervention was performed.  In follow-up her beta blocker was increased and  she subsequently had resolution of her symptoms.    Today Marli's care to follow-up her office visits her primary care yesterday.  She reports a three-week history of daily and sometimes constant angina.  Her symptoms are worse with exertion.  Recently they've been accompanied with some shortness of breath even at rest.  Yesterday she had constant chest pain radiating into her shoulder blades.  She took three nitroglycerin and has had resolution of all of her symptoms.  She currently denies any symptoms.  She saw her primary care doctor, her CK was 1.0, her troponin was 0.04, and her BNP was 33.  Her EKG demonstrated sinus bradycardia with no evidence of ST elevation, it is abnormal but unchanged.  She endorses no symptoms today.  Unfortunately just before I walked in exam room she found out that her friend had passed away and she was a little bit distraught.  She said despite the sadness she was actually somewhat believe that she was not experiencing any chest pain with it.  She denies any symptoms of orthopnea, PND, lightheadedness or dizziness, syncope or presyncope.    Physical exam  General.  57-year-old woman.  Appears comfortable and in no acute distress.  Vital signs.  Blood pressure 127/70, heart rate 50, weight 195, BMI 34  Lungs.  Bilaterally clear to auscultation, no wheezing or crackles  Cardiac.  She had a regular rate.  She had a soft systolic murmur  Extremities.  No lower extremity edema    Assessment and plan  1. Chest pain in a patient with significant coronary artery disease.  Her symptoms of chest pain have actually resolved she has not had any for the last 24 hours.  She says that prior to this she did have symptoms for the last few weeks on and off.  She was nonetheless able to participate in 12 choir concert despite having symptoms.  Her coronary angiogram in March of this year did not demonstrate progression of her disease.  Her labs from her PCP do not suggest an acute process.  My  recommendation is that we get an echocardiogram, it has been quite sometime since one was completed.  I will also increase her amlodipine from 5 mg in the morning and to a half in the evening to 5 mg twice a day.  She is planning to go out of town this weekend and next week.  I recommended that she bring her most recent EKG with her as well as nitroglycerin.  This is an important family trip for her that she does not want to miss.  2. Hypertension.  Her blood pressure is a bit elevated hopefully by increasing the amlodipine we can push it down a little bit.    I will see her in follow-up to review the results of her echocardiogram and her response to the medication changes made.  Should she have change in her symptoms, she's been instructed to call us or seek medical attention.  I recommended that she bring sublingual nitroglycerin and her family vacation.    ERIK Horton, CNP        Orders Placed This Encounter   Procedures     Follow-Up with Cardiac Advanced Practice Provider     Echocardiogram       Orders Placed This Encounter   Medications     amLODIPine (NORVASC) 5 MG tablet     Sig: Take 1 tablet (5 mg) by mouth 2 times daily     Refill:  1       Medications Discontinued During This Encounter   Medication Reason     amLODIPine (NORVASC) 5 MG tablet Reorder         Encounter Diagnosis   Name Primary?     Coronary artery disease involving native coronary artery of native heart without angina pectoris        CURRENT MEDICATIONS:  Current Outpatient Prescriptions   Medication Sig Dispense Refill     amLODIPine (NORVASC) 5 MG tablet Take 1 tablet (5 mg) by mouth 2 times daily  1     metoprolol (LOPRESSOR) 50 MG tablet Take 0.5 tablets (25 mg) by mouth 2 times daily 180 tablet 3     liothyronine (CYTOMEL) 5 MCG tablet Take 5 mcg by mouth daily       nitroglycerin (NITROSTAT) 0.4 MG sublingual tablet Place 1 tablet (0.4 mg) under the tongue every 5 minutes as needed 25 tablet 3     rosuvastatin (CRESTOR)  20 MG tablet Take 1 tablet (20 mg) by mouth daily (Patient taking differently: Take 40 mg by mouth daily ) 90 tablet 2     isosorbide mononitrate (IMDUR) 60 MG 24 hr tablet Take 1 tablet (60 mg) by mouth daily 90 tablet 2     denosumab (PROLIA) 60 MG/ML SOLN Inject 60 mg Subcutaneous every 6 months       CALCIUM-VITAMIN D PO Take 1 tablet by mouth every evening (strength unknown, does note that calcium is 800 mg)       VITAMIN D, CHOLECALCIFEROL, PO Take 1,000 Units by mouth every evening       COENZYME Q-10 PO Take 100 mg by mouth daily       arginine 500 MG tablet Take 500 mg by mouth 2 times daily        Omega-3 Fatty Acids (OMEGA-3 FISH OIL PO) Take 2 g by mouth 2 times daily        aspirin 81 MG EC tablet Take 162 mg by mouth At Bedtime        Levothyroxine Sodium (SYNTHROID PO) Take 88 mcg by mouth daily        [DISCONTINUED] amLODIPine (NORVASC) 5 MG tablet Take 5 mg in AM, take 1/2 tab-2.5 mg in  tablet 1     MAGNESIUM OXIDE PO Take 200 mg by mouth every evening         ALLERGIES     Allergies   Allergen Reactions     Bactrim [Sulfamethoxazole W/Trimethoprim]      rash     Erythromycin Cramps     Sulfa Drugs      Tape [Adhesive Tape]        PAST MEDICAL HISTORY:  Past Medical History:   Diagnosis Date     Chronic left hip pain      Coronary artery disease     s/p CABG 2006 (LIMA to LAD and saphneous vein graft to OM1 and OM3), and KERRI to left main in 2013     DM type 2 (diabetes mellitus, type 2) (H)      Gallstone pancreatitis 2012     Graves disease     s/p radioiodide therapy in 2001, now takes synthroid     Hyperlipidemia      Hypertension        PAST SURGICAL HISTORY:  Past Surgical History:   Procedure Laterality Date     CARPAL TUNNEL RELEASE RT/LT       CHOLECYSTECTOMY  4/12     CORONARY ANGIOGRAPHY ADULT ORDER  11/2006    70% ostial left main lesion, 50-60% mid LAD stenosis prox. to LIMA insertion site, circumflex with widely patent saph vein graft into first obtuse marginal, tiny  posterolateral branch occluded     CORONARY ANGIOGRAPHY ADULT ORDER  12/2006    attempted circ OM3 PTCA     CORONARY ANGIOGRAPHY ADULT ORDER  6/2013    KERRI to ostial left main, widely patent LIMA to LAD and saphenous vein graft     CORONARY ANGIOGRAPHY ADULT ORDER  1/2014    left main stent widely patent, LIMA to LAD and vein graft to first obtuse marginal patent     CORONARY ANGIOGRAPHY ADULT ORDER  10/13/16    Left main:there is evidence of previous stent implantation in the left main with no evidence of restenosis. The distal and mid LAD are free of signigficant narrowing. The bypass graft to the marginal branch is widely patent. The study is unchanged since her last study on 11/14/2014.     CORONARY ARTERY BYPASS  3/2006    LIMA to LAD, sequential SV grafts to OM1 and OM3     DILATION AND CURETTAGE, HYSTEROSCOPY, ABLATE ENDOMETRIUM NOVASURE, COMBINED  6/28/2012    Procedure: COMBINED DILATION AND CURETTAGE, HYSTEROSCOPY, ABLATE ENDOMETRIUM NOVASURE;  DILATION AND CURETTAGE, HYSTEROSCOPY, ABLATE ENDOMETRIUM,pelvic exam under anesthesia;  Surgeon: Johnna Harley MD;  Location: [facility] OR     LAPAROSCOPIC HYSTERECTOMY TOTAL, BILATERAL SALPINGO-OOPHORECTOMY, COMBINED  9/20/2012    Procedure: COMBINED LAPAROSCOPIC HYSTERECTOMY TOTAL, SALPINGO-OOPHORECTOMY;  LAPAROSCOPIC HYSTERECTOMY TOTAL, SALPINGO-OOPHORECTOMY;  Surgeon: Johnna Harley MD;  Location: [facility] OR     LASIK BILATERAL       REPAIR VAGINAL CUFF  10/31/2012    Procedure: REPAIR VAGINAL CUFF;  Irrigation and repair of Vaginal Cuff;  Surgeon: Johnna Harley MD;  Location: [facility] OR     Right thumb mass excision  2008     [place] teeth removal         FAMILY HISTORY:  History reviewed. No pertinent family history.    SOCIAL HISTORY:  Social History     Social History     Marital status:      Spouse name: N/A     Number of children: N/A     Years of education: N/A     Social History Main Topics     Smoking status: Never Smoker     Smokeless tobacco: Never  "Used     Alcohol use 0.0 oz/week     0 Standard drinks or equivalent per week      Comment: rare     Drug use: No     Sexual activity: Not Asked     Other Topics Concern     Caffeine Concern Yes     10 oz of coffee per day     Sleep Concern No     Weight Concern Yes     weight gain     Special Diet Yes     gluten free     Exercise Yes     walking 2 miles daily     Social History Narrative       Review of Systems:  Skin:          Eyes:  Positive for glasses    ENT:  Negative      Respiratory:  Positive for dyspnea on exertion;shortness of breath     Cardiovascular:  Negative      Gastroenterology: Negative      Genitourinary:  Negative      Musculoskeletal:  Negative      Neurologic:  Negative      Psychiatric:  Positive for excessive stress    Heme/Lymph/Imm:  Negative      Endocrine:  Positive for thyroid disorder      Physical Exam:  Vitals: /70  Pulse 50  Ht 1.613 m (5' 3.5\")  Wt 88.5 kg (195 lb)  BMI 34 kg/m2    Constitutional:  cooperative;alert and oriented        Skin:  warm and dry to the touch        Head:  normocephalic        Eyes:  pupils equal and round        ENT:  dentition good        Neck:  JVP normal        Chest:  normal breath sounds, clear to auscultation, normal A-P diameter, normal symmetry, normal respiratory excursion, no use of accessory muscles;healed median sternotomy scar          Cardiac: regular rhythm;normal S1 and S2       systolic murmur          Abdomen:  abdomen soft        Vascular:                                          Extremities and Back:  no edema;no deformities, clubbing, cyanosis, erythema observed              Neurological:  affect appropriate, oriented to time, person and place;no gross motor deficits        Thank you for allowing me to participate in the care of your patient.    Sincerely,     ERKI Mclaughlin [facility]    "

## 2017-08-24 NOTE — PROGRESS NOTES
HPI and Plan:   Ms. Nguyen is a very pleasant 56-year-old female with a history of coronary artery disease, previous 2-vessel CABG in 2006 with a LIMA graft to the LAD and vein graft to an obtuse marginal 2.  She had direct left main stenting in 2013.  She is a past medical history of hypothyroidism.  I am asked to see her today after she went to her primary care office yesterday with symptoms of chest pain.  Her troponins there were negative, her BNP was trivial, her CK was normal, her EKG did not demonstrate any ST elevation, she was in a sinus bradycardia.    She last saw Dr. Sheppard in March of this year following a visit to primary care clinic for chest pain with radiation down her left arm and into her jaw, workup at her PCP showed normal cardiac enzymes and unremarkable EKG.  Her symptoms were exertional and Dr. Sheppard felt, given her complex cardiac history, a coronary angiogram would be the best way to evaluate progression of her disease.    Her coronary angiogram in March failed to demonstrate any flow limiting lesions.  Her LV gram showed EF of 55%.  Her left main was moderately calcified with mild luminal irregularities, her LAD showed minimal luminal irregularities, her left circumflex was dominant with minimal luminal irregularities, she had a small nondominant right coronary arteries with minimal luminal irregularities.  Her LIMA graft was enteric but patent, her saphenous vein graft to OM1 was widely patent with a prominent valve mid graft.  The graft provides competitive flow to the entire left circumflex and LAD circulation.  There was no evidence of subclavian stenosis.  No intervention was performed.  In follow-up her beta blocker was increased and she subsequently had resolution of her symptoms.    Today Marli's care to follow-up her office visits her primary care yesterday.  She reports a three-week history of daily and sometimes constant angina.  Her symptoms are worse with exertion.   Recently they've been accompanied with some shortness of breath even at rest.  Yesterday she had constant chest pain radiating into her shoulder blades.  She took three nitroglycerin and has had resolution of all of her symptoms.  She currently denies any symptoms.  She saw her primary care doctor, her CK was 1.0, her troponin was 0.04, and her BNP was 33.  Her EKG demonstrated sinus bradycardia with no evidence of ST elevation, it is abnormal but unchanged.  She endorses no symptoms today.  Unfortunately just before I walked in exam room she found out that her friend had passed away and she was a little bit distraught.  She said despite the sadness she was actually somewhat believe that she was not experiencing any chest pain with it.  She denies any symptoms of orthopnea, PND, lightheadedness or dizziness, syncope or presyncope.    Physical exam  General.  57-year-old woman.  Appears comfortable and in no acute distress.  Vital signs.  Blood pressure 127/70, heart rate 50, weight 195, BMI 34  Lungs.  Bilaterally clear to auscultation, no wheezing or crackles  Cardiac.  She had a regular rate.  She had a soft systolic murmur  Extremities.  No lower extremity edema    Assessment and plan  1. Chest pain in a patient with significant coronary artery disease.  Her symptoms of chest pain have actually resolved she has not had any for the last 24 hours.  She says that prior to this she did have symptoms for the last few weeks on and off.  She was nonetheless able to participate in 12 choir concert despite having symptoms.  Her coronary angiogram in March of this year did not demonstrate progression of her disease.  Her labs from her PCP do not suggest an acute process.  My recommendation is that we get an echocardiogram, it has been quite sometime since one was completed.  I will also increase her amlodipine from 5 mg in the morning and to a half in the evening to 5 mg twice a day.  She is planning to go out of town this  weekend and next week.  I recommended that she bring her most recent EKG with her as well as nitroglycerin.  This is an important family trip for her that she does not want to miss.  2. Hypertension.  Her blood pressure is a bit elevated hopefully by increasing the amlodipine we can push it down a little bit.    I will see her in follow-up to review the results of her echocardiogram and her response to the medication changes made.  Should she have change in her symptoms, she's been instructed to call us or seek medical attention.  I recommended that she bring sublingual nitroglycerin and her family vacation.    Basia Dawkins, ERIK, CNP        Orders Placed This Encounter   Procedures     Follow-Up with Cardiac Advanced Practice Provider     Echocardiogram       Orders Placed This Encounter   Medications     amLODIPine (NORVASC) 5 MG tablet     Sig: Take 1 tablet (5 mg) by mouth 2 times daily     Refill:  1       Medications Discontinued During This Encounter   Medication Reason     amLODIPine (NORVASC) 5 MG tablet Reorder         Encounter Diagnosis   Name Primary?     Coronary artery disease involving native coronary artery of native heart without angina pectoris        CURRENT MEDICATIONS:  Current Outpatient Prescriptions   Medication Sig Dispense Refill     amLODIPine (NORVASC) 5 MG tablet Take 1 tablet (5 mg) by mouth 2 times daily  1     metoprolol (LOPRESSOR) 50 MG tablet Take 0.5 tablets (25 mg) by mouth 2 times daily 180 tablet 3     liothyronine (CYTOMEL) 5 MCG tablet Take 5 mcg by mouth daily       nitroglycerin (NITROSTAT) 0.4 MG sublingual tablet Place 1 tablet (0.4 mg) under the tongue every 5 minutes as needed 25 tablet 3     rosuvastatin (CRESTOR) 20 MG tablet Take 1 tablet (20 mg) by mouth daily (Patient taking differently: Take 40 mg by mouth daily ) 90 tablet 2     isosorbide mononitrate (IMDUR) 60 MG 24 hr tablet Take 1 tablet (60 mg) by mouth daily 90 tablet 2     denosumab (PROLIA) 60 MG/ML  SOLN Inject 60 mg Subcutaneous every 6 months       CALCIUM-VITAMIN D PO Take 1 tablet by mouth every evening (strength unknown, does note that calcium is 800 mg)       VITAMIN D, CHOLECALCIFEROL, PO Take 1,000 Units by mouth every evening       COENZYME Q-10 PO Take 100 mg by mouth daily       arginine 500 MG tablet Take 500 mg by mouth 2 times daily        Omega-3 Fatty Acids (OMEGA-3 FISH OIL PO) Take 2 g by mouth 2 times daily        aspirin 81 MG EC tablet Take 162 mg by mouth At Bedtime        Levothyroxine Sodium (SYNTHROID PO) Take 88 mcg by mouth daily        [DISCONTINUED] amLODIPine (NORVASC) 5 MG tablet Take 5 mg in AM, take 1/2 tab-2.5 mg in  tablet 1     MAGNESIUM OXIDE PO Take 200 mg by mouth every evening         ALLERGIES     Allergies   Allergen Reactions     Bactrim [Sulfamethoxazole W/Trimethoprim]      rash     Erythromycin Cramps     Sulfa Drugs      Tape [Adhesive Tape]        PAST MEDICAL HISTORY:  Past Medical History:   Diagnosis Date     Chronic left hip pain      Coronary artery disease     s/p CABG 2006 (LIMA to LAD and saphneous vein graft to OM1 and OM3), and KERRI to left main in 2013     DM type 2 (diabetes mellitus, type 2) (H)      Gallstone pancreatitis 2012     Graves disease     s/p radioiodide therapy in 2001, now takes synthroid     Hyperlipidemia      Hypertension        PAST SURGICAL HISTORY:  Past Surgical History:   Procedure Laterality Date     CARPAL TUNNEL RELEASE RT/LT       CHOLECYSTECTOMY  4/12     CORONARY ANGIOGRAPHY ADULT ORDER  11/2006    70% ostial left main lesion, 50-60% mid LAD stenosis prox. to LIMA insertion site, circumflex with widely patent saph vein graft into first obtuse marginal, tiny posterolateral branch occluded     CORONARY ANGIOGRAPHY ADULT ORDER  12/2006    attempted circ OM3 PTCA     CORONARY ANGIOGRAPHY ADULT ORDER  6/2013    KERRI to ostial left main, widely patent LIMA to LAD and saphenous vein graft     CORONARY ANGIOGRAPHY ADULT ORDER   1/2014    left main stent widely patent, LIMA to LAD and vein graft to first obtuse marginal patent     CORONARY ANGIOGRAPHY ADULT ORDER  10/13/16    Left main:there is evidence of previous stent implantation in the left main with no evidence of restenosis. The distal and mid LAD are free of signigficant narrowing. The bypass graft to the marginal branch is widely patent. The study is unchanged since her last study on 11/14/2014.     CORONARY ARTERY BYPASS  3/2006    LIMA to LAD, sequential SV grafts to OM1 and OM3     DILATION AND CURETTAGE, HYSTEROSCOPY, ABLATE ENDOMETRIUM NOVASURE, COMBINED  6/28/2012    Procedure: COMBINED DILATION AND CURETTAGE, HYSTEROSCOPY, ABLATE ENDOMETRIUM NOVASURE;  DILATION AND CURETTAGE, HYSTEROSCOPY, ABLATE ENDOMETRIUM,pelvic exam under anesthesia;  Surgeon: Johnna Harley MD;  Location: [facility] OR     LAPAROSCOPIC HYSTERECTOMY TOTAL, BILATERAL SALPINGO-OOPHORECTOMY, COMBINED  9/20/2012    Procedure: COMBINED LAPAROSCOPIC HYSTERECTOMY TOTAL, SALPINGO-OOPHORECTOMY;  LAPAROSCOPIC HYSTERECTOMY TOTAL, SALPINGO-OOPHORECTOMY;  Surgeon: Johnna Harley MD;  Location: [facility] OR     LASIK BILATERAL       REPAIR VAGINAL CUFF  10/31/2012    Procedure: REPAIR VAGINAL CUFF;  Irrigation and repair of Vaginal Cuff;  Surgeon: Johnna Harley MD;  Location: [facility] OR     Right thumb mass excision  2008     [place] teeth removal         FAMILY HISTORY:  History reviewed. No pertinent family history.    SOCIAL HISTORY:  Social History     Social History     Marital status:      Spouse name: N/A     Number of children: N/A     Years of education: N/A     Social History Main Topics     Smoking status: Never Smoker     Smokeless tobacco: Never Used     Alcohol use 0.0 oz/week     0 Standard drinks or equivalent per week      Comment: rare     Drug use: No     Sexual activity: Not Asked     Other Topics Concern     Caffeine Concern Yes     10 oz of coffee per day     Sleep Concern No     Weight Concern Yes  "    weight gain     Special Diet Yes     gluten free     Exercise Yes     walking 2 miles daily     Social History Narrative       Review of Systems:  Skin:          Eyes:  Positive for glasses    ENT:  Negative      Respiratory:  Positive for dyspnea on exertion;shortness of breath     Cardiovascular:  Negative      Gastroenterology: Negative      Genitourinary:  Negative      Musculoskeletal:  Negative      Neurologic:  Negative      Psychiatric:  Positive for excessive stress    Heme/Lymph/Imm:  Negative      Endocrine:  Positive for thyroid disorder      Physical Exam:  Vitals: /70  Pulse 50  Ht 1.613 m (5' 3.5\")  Wt 88.5 kg (195 lb)  BMI 34 kg/m2    Constitutional:  cooperative;alert and oriented        Skin:  warm and dry to the touch        Head:  normocephalic        Eyes:  pupils equal and round        ENT:  dentition good        Neck:  JVP normal        Chest:  normal breath sounds, clear to auscultation, normal A-P diameter, normal symmetry, normal respiratory excursion, no use of accessory muscles;healed median sternotomy scar          Cardiac: regular rhythm;normal S1 and S2       systolic murmur          Abdomen:  abdomen soft        Vascular:                                          Extremities and Back:  no edema;no deformities, clubbing, cyanosis, erythema observed              Neurological:  affect appropriate, oriented to time, person and place;no gross motor deficits              CC  Aixa Brantley MD  [facility]  [address] [place] [address]              "

## 2017-08-24 NOTE — MR AVS SNAPSHOT
After Visit Summary   8/24/2017    Marli Nguyen    MRN: [id number]           Patient Information     Date Of Birth          1960        Visit Information        Provider Department      8/24/2017 10:50 AM Basia Dawkins APRN [facility] [place]        Today's Diagnoses     Coronary artery disease involving native coronary artery of native heart without angina pectoris          Care Instructions    Please increase amlodipine to 5 mg twice daily.    We will do an echo and have you see me in follow up next month.    Call if you have recurrent symptoms before you see me.     [place]  [phone]            Follow-ups after your visit        Additional Services     Follow-Up with Cardiac Advanced Practice Provider                 Your next 10 appointments already scheduled     Sep 07, 2017  3:00 PM CDT   Return Visit with Shanel Swain PA-C   [facility] [place] ([facility])    [address] [place] [id number] [phone]              Future tests that were ordered for you today     Open Future Orders        Priority Expected Expires Ordered    Echocardiogram Routine 9/23/2017 8/24/2018 8/24/2017    Follow-Up with Cardiac Advanced Practice Provider Routine 9/23/2017 8/24/2018 8/24/2017            Who to contact     If you have questions or need follow up information about today's clinic visit or your schedule please contact [facility] [place] directly at [phone].  Normal or non-critical lab and imaging results will be communicated to you by MyChart, letter or phone within 4 business days after the clinic has received the results. If you do not hear from us within 7 days, please contact the clinic through MyChart or phone. If you have a critical or abnormal lab result, we will notify you by phone as soon as possible.  Submit refill  "requests through [employer] or call your pharmacy and they will forward the refill request to us. Please allow 3 business days for your refill to be completed.          Additional Information About Your Visit        [employer] Information     [employer] gives you secure access to your electronic health record. If you see a primary care provider, you can also send messages to your care team and make appointments. If you have questions, please call your primary care clinic.  If you do not have a primary care provider, please call [phone] and they will assist you.        Care EveryWhere ID     This is your Care EveryWhere ID. This could be used by other organizations to access your [place] medical records  [id number]        Your Vitals Were     Pulse Height BMI (Body Mass Index)             50 1.613 m (5' 3.5\") 34 kg/m2          Blood Pressure from Last 3 Encounters:   08/24/17 127/70   03/15/17 128/84   03/02/17 124/66    Weight from Last 3 Encounters:   08/24/17 88.5 kg (195 lb)   03/15/17 86.8 kg (191 lb 4.8 oz)   03/02/17 85.7 kg (189 lb)                 Today's Medication Changes          These changes are accurate as of: 8/24/17 11:26 AM.  If you have any questions, ask your nurse or doctor.               These medicines have changed or have updated prescriptions.        Dose/Directions    amLODIPine 5 MG tablet   Commonly known as:  NORVASC   This may have changed:    - how much to take  - how to take this  - when to take this  - additional instructions   Used for:  Coronary artery disease involving native coronary artery of native heart without angina pectoris   Changed by:  Basia Dawkins APRN CNP        Dose:  5 mg   Take 1 tablet (5 mg) by mouth 2 times daily   Refills:  1       rosuvastatin 20 MG tablet   Commonly known as:  CRESTOR   This may have changed:  how much to take   Used for:  Coronary artery disease involving native coronary artery of native heart without angina pectoris        " Dose:  20 mg   Take 1 tablet (20 mg) by mouth daily   Quantity:  90 tablet   Refills:  2                Primary Care Provider Office Phone # Fax #    Aixa Brantley [phone]       [facility] [address]  [facility] [address]        Equal Access to Services     CHINLYN KEEGAN AH: Hadii aad ku hadasho Soomaali, waaxda luqadaha, qaybta kaalmada adeegyada, waxay rafaelin haybetzyn adealexandra broelle laanirudh ah. So [facility] [phone].    ATENCIÓN: Si habla español, tiene a griffiths disposición servicios gratuitos de asistencia lingüística. Suzanne al [phone].    We comply with applicable federal civil rights laws and [place] laws. We do not discriminate on the basis of race, color, national origin, age, disability sex, sexual orientation or gender identity.            Thank you!     Thank you for choosing [facility] [place]  for your care. Our goal is always to provide you with excellent care. Hearing back from our patients is one way we can continue to improve our services. Please take a few minutes to complete the written survey that you may receive in the mail after your visit with us. Thank you!             Your Updated Medication List - Protect others around you: Learn how to safely use, store and throw away your medicines at www.disposemymeds.org.          This list is accurate as of: 8/24/17 11:26 AM.  Always use your most recent med list.                   Brand Name Dispense Instructions for use Diagnosis    amLODIPine 5 MG tablet    NORVASC     Take 1 tablet (5 mg) by mouth 2 times daily    Coronary artery disease involving native coronary artery of native heart without angina pectoris       arginine 500 MG tablet      Take 500 mg by mouth 2 times daily        aspirin 81 MG EC tablet      Take 162 mg by mouth At Bedtime        CALCIUM-VITAMIN D PO      Take 1 tablet by mouth every evening (strength unknown, does note that calcium is 800 mg)         COENZYME Q-10 PO      Take 100 mg by mouth daily        CYTOMEL 5 MCG tablet   Generic drug:  liothyronine      Take 5 mcg by mouth daily        isosorbide mononitrate 60 MG 24 hr tablet    IMDUR    90 tablet    Take 1 tablet (60 mg) by mouth daily    CAD (coronary artery disease)       MAGNESIUM OXIDE PO      Take 200 mg by mouth every evening        metoprolol 50 MG tablet    LOPRESSOR    180 tablet    Take 0.5 tablets (25 mg) by mouth 2 times daily    Coronary artery disease involving native coronary artery of native heart with angina pectoris (H)       nitroGLYcerin 0.4 MG sublingual tablet    NITROSTAT    25 tablet    Place 1 tablet (0.4 mg) under the tongue every 5 minutes as needed    Unstable angina (H), Coronary artery disease involving native coronary artery of native heart without angina pectoris       OMEGA-3 FISH OIL PO      Take 2 g by mouth 2 times daily        PROLIA 60 MG/ML Soln injection   Generic drug:  denosumab      Inject 60 mg Subcutaneous every 6 months        rosuvastatin 20 MG tablet    CRESTOR    90 tablet    Take 1 tablet (20 mg) by mouth daily    Coronary artery disease involving native coronary artery of native heart without angina pectoris       SYNTHROID PO      Take 88 mcg by mouth daily        VITAMIN D (CHOLECALCIFEROL) PO      Take 1,000 Units by mouth every evening

## 2017-08-24 NOTE — PATIENT INSTRUCTIONS
Please increase amlodipine to 5 mg twice daily.    We will do an echo and have you see me in follow up next month.    Call if you have recurrent symptoms before you see me.     Georgiana [phone]

## 2017-09-29 ENCOUNTER — OFFICE VISIT (OUTPATIENT)
Dept: CARDIOLOGY | Facility: CLINIC | Age: 57
End: 2017-09-29
Attending: NURSE PRACTITIONER
Payer: COMMERCIAL

## 2017-09-29 ENCOUNTER — HOSPITAL ENCOUNTER (OUTPATIENT)
Dept: CARDIOLOGY | Facility: CLINIC | Age: 57
Discharge: HOME | End: 2017-09-29
Attending: NURSE PRACTITIONER | Admitting: NURSE PRACTITIONER
Payer: COMMERCIAL

## 2017-09-29 VITALS
DIASTOLIC BLOOD PRESSURE: 72 MMHG | SYSTOLIC BLOOD PRESSURE: 124 MMHG | BODY MASS INDEX: 34.31 KG/M2 | HEART RATE: 75 BPM | WEIGHT: 201 LBS | OXYGEN SATURATION: 95 % | HEIGHT: 64 IN

## 2017-09-29 DIAGNOSIS — I25.10 CORONARY ARTERY DISEASE INVOLVING NATIVE CORONARY ARTERY OF NATIVE HEART WITHOUT ANGINA PECTORIS: ICD-10-CM

## 2017-09-29 PROCEDURE — 99214 OFFICE O/P EST MOD 30 MIN: CPT | Mod: 25 | Performed by: NURSE PRACTITIONER

## 2017-09-29 PROCEDURE — 93306 TTE W/DOPPLER COMPLETE: CPT | Mod: 26 | Performed by: INTERNAL MEDICINE

## 2017-09-29 PROCEDURE — 40000264 ECHO COMPLETE WITH OPTISON

## 2017-09-29 PROCEDURE — 25500064 ZZH RX 255 OP 636: Performed by: NURSE PRACTITIONER

## 2017-09-29 RX ADMIN — HUMAN ALBUMIN MICROSPHERES AND PERFLUTREN 3 ML: 10; .22 INJECTION, SOLUTION INTRAVENOUS at 09:42

## 2017-09-29 NOTE — PROGRESS NOTES
HPI and Plan:   Ms. Nguyen is a very pleasant 57-year-old patient of Dr. Sheppard with a history of coronary artery disease, previous 2-vessel CABG in 2006 with a LIMA graft to the LAD and vein graft to an obtuse marginal 2.  She had direct left main stenting in 2013.  She is a past medical history of Graves disease with medically induced hypothyroidism.     She last saw Dr. Sheppard in March following a visit to primary care clinic for chest pain with radiation down her left arm and into her jaw, workup at her PCP showed normal cardiac enzymes and unremarkable EKG.  Her symptoms were exertional and Dr. Sheppard felt, given her complex cardiac history, a coronary angiogram would be the best way to evaluate progression of her disease. Her coronary angiogram in March failed to demonstrate any flow limiting lesions.  Her LV gram showed EF of 55%.  Her left main was moderately calcified with mild luminal irregularities, her LAD showed minimal luminal irregularities, her left circumflex was dominant with minimal luminal irregularities, she had a small nondominant right coronary arteries with minimal luminal irregularities.  Her LIMA graft was enteric but patent, her saphenous vein graft to OM1 was widely patent with a prominent valve mid graft.  The graft provides competitive flow to the entire left circumflex and LAD circulation.  There was no evidence of subclavian stenosis.  No intervention was performed.  In follow-up her beta blocker was increased and she subsequently had resolution of her symptoms.     I saw Marli in August at the request of her primary care doctor.  At that time she reported a three-week history of daily and sometimes constant angina.  Her symptoms are worse with exertion, sometimes with shortness of breath, sometimes relieved by nitroglycerin.  At that time her CK was 1.0, her troponin was 0.04, and her BNP was 33.  Her EKG was unchanged. Because she was no longer having symptoms and had recently  had a angiogram, I recommended echocardiogram which we reviewed today.  Her echocardiogram showed an EF of 55%, mild pulmonic valvular regurgitation, there were no wall motion abnormalities.  At her last visit she was taking 5 mg of amlodipine in the morning and half a tablet in the evening.  For her angina I increased her amlodipine to 5 mg twice a day.    Today Marli says she's feeling well.  She has on occasion this intermittent chest discomfort but has not had any significant episodes since her last visit.  She thinks the amlodipine has really helped her symptoms.  She works in a dental office, she says her work has actually been very stressful and she wonders if this is causing her cardiac problems, she wonders if her cardiac problems are significant enough to merit disability.  I told her that that would be very unlikely given her echocardiogram and most recent coronary angiogram, but that I would defer to Dr. Sheppard.  She also wonders today if her injection of Prolia could've caused her significant weight gain and she's had over the last year.  She saw her endocrinologist today he apparently hetal several laps.  My research quick online look into Prolia found no evidence for weight gain or endocrine problems as a side effect.    Physical exam  General.  57-year-old woman, appears comfortable  Vital signs.  Blood pressure 124/72, heart rate 75, weight 201 pounds, BMI 35  Lungs are bilaterally clear to auscultation, no wheezing  Cardiac.  Rate regular, normal S1 and S2  Extremities.  No lower extremities edema    Assessment and plan  1. Premature coronary artery disease with two-vessel CABG in 2006 with a LIMA to the LAD and vein graft to OM two followed by left main stenting in 2013.  Her last ischemic workup in March failed to demonstrate any flow limiting lesions.  Echocardiogram done today demonstrated no wall motion abnormalities.  She does not have ischemic symptoms at this time.  I recommend continued  medical management.  2. Hypertension.  Her blood pressure is well-controlled today    Thank you for allowing me to see Marli.  She should follow-up with Dr. Sheppard in six months.       Orders Placed This Encounter   Procedures     Basic metabolic panel     Follow-Up with Cardiologist       Orders Placed This Encounter   Medications     Rosuvastatin Calcium (CRESTOR PO)     Sig: Take 40 mg by mouth daily       Medications Discontinued During This Encounter   Medication Reason     rosuvastatin (CRESTOR) 20 MG tablet Medication Reconciliation Clean Up         Encounter Diagnosis   Name Primary?     Coronary artery disease involving native coronary artery of native heart without angina pectoris        CURRENT MEDICATIONS:  Current Outpatient Prescriptions   Medication Sig Dispense Refill     Rosuvastatin Calcium (CRESTOR PO) Take 40 mg by mouth daily       amLODIPine (NORVASC) 5 MG tablet Take 1 tablet (5 mg) by mouth 2 times daily  1     metoprolol (LOPRESSOR) 50 MG tablet Take 0.5 tablets (25 mg) by mouth 2 times daily 180 tablet 3     liothyronine (CYTOMEL) 5 MCG tablet Take 5 mcg by mouth daily       nitroglycerin (NITROSTAT) 0.4 MG sublingual tablet Place 1 tablet (0.4 mg) under the tongue every 5 minutes as needed 25 tablet 3     isosorbide mononitrate (IMDUR) 60 MG 24 hr tablet Take 1 tablet (60 mg) by mouth daily 90 tablet 2     denosumab (PROLIA) 60 MG/ML SOLN Inject 60 mg Subcutaneous every 6 months       CALCIUM-VITAMIN D PO Take 1 tablet by mouth every evening (strength unknown, does note that calcium is 800 mg)       VITAMIN D, CHOLECALCIFEROL, PO Take 1,000 Units by mouth every evening       MAGNESIUM OXIDE PO Take 200 mg by mouth every evening       COENZYME Q-10 PO Take 100 mg by mouth daily       arginine 500 MG tablet Take 500 mg by mouth 2 times daily        Omega-3 Fatty Acids (OMEGA-3 FISH OIL PO) Take 2 g by mouth 2 times daily        aspirin 81 MG EC tablet Take 162 mg by mouth At Bedtime         Levothyroxine Sodium (SYNTHROID PO) Take 88 mcg by mouth daily        [DISCONTINUED] rosuvastatin (CRESTOR) 20 MG tablet Take 1 tablet (20 mg) by mouth daily (Patient taking differently: Take 40 mg by mouth daily ) 90 tablet 2       ALLERGIES     Allergies   Allergen Reactions     Bactrim [Sulfamethoxazole W/Trimethoprim]      rash     Erythromycin Cramps     Sulfa Drugs      Tape [Adhesive Tape]        PAST MEDICAL HISTORY:  Past Medical History:   Diagnosis Date     Angina pectoris (H) 6/24/2013     Cardiac microvascular disease (H)      Chronic left hip pain      Coronary artery disease     s/p CABG 2006 (LIMA to LAD and saphneous vein graft to OM1 and OM3), and KERRI to left main in 2013     DM type 2 (diabetes mellitus, type 2) (H)      Gallstone pancreatitis 2012     Graves disease     s/p radioiodide therapy in 2001, now takes synthroid     Hyperlipidemia      Hypertension      SIRS (systemic inflammatory response syndrome) (H) 9/8/2012     Unstable angina (H) 10/12/2016       PAST SURGICAL HISTORY:  Past Surgical History:   Procedure Laterality Date     CARPAL TUNNEL RELEASE RT/LT       CHOLECYSTECTOMY  4/12     CORONARY ANGIOGRAPHY ADULT ORDER  11/2006    70% ostial left main lesion, 50-60% mid LAD stenosis prox. to LIMA insertion site, circumflex with widely patent saph vein graft into first obtuse marginal, tiny posterolateral branch occluded     CORONARY ANGIOGRAPHY ADULT ORDER  12/2006    attempted circ OM3 PTCA     CORONARY ANGIOGRAPHY ADULT ORDER  6/2013    KERRI to ostial left main, widely patent LIMA to LAD and saphenous vein graft     CORONARY ANGIOGRAPHY ADULT ORDER  1/2014    left main stent widely patent, LIMA to LAD and vein graft to first obtuse marginal patent     CORONARY ANGIOGRAPHY ADULT ORDER  10/13/16    Left main:there is evidence of previous stent implantation in the left main with no evidence of restenosis. The distal and mid LAD are free of signigficant narrowing. The bypass graft to  the marginal branch is widely patent. The study is unchanged since her last study on 11/14/2014.     CORONARY ARTERY BYPASS  3/2006    LIMA to LAD, sequential SV grafts to OM1 and OM3     DILATION AND CURETTAGE, HYSTEROSCOPY, ABLATE ENDOMETRIUM NOVASURE, COMBINED  6/28/2012    Procedure: COMBINED DILATION AND CURETTAGE, HYSTEROSCOPY, ABLATE ENDOMETRIUM NOVASURE;  DILATION AND CURETTAGE, HYSTEROSCOPY, ABLATE ENDOMETRIUM,pelvic exam under anesthesia;  Surgeon: Johnna Harley MD;  Location: [facility] OR     LAPAROSCOPIC HYSTERECTOMY TOTAL, BILATERAL SALPINGO-OOPHORECTOMY, COMBINED  9/20/2012    Procedure: COMBINED LAPAROSCOPIC HYSTERECTOMY TOTAL, SALPINGO-OOPHORECTOMY;  LAPAROSCOPIC HYSTERECTOMY TOTAL, SALPINGO-OOPHORECTOMY;  Surgeon: Johnna Harley MD;  Location: [facility] OR     LASIK BILATERAL       REPAIR VAGINAL CUFF  10/31/2012    Procedure: REPAIR VAGINAL CUFF;  Irrigation and repair of Vaginal Cuff;  Surgeon: Johnna Harley MD;  Location: [facility] OR     Right thumb mass excision  2008     [place] teeth removal         FAMILY HISTORY:  Family History   Problem Relation Age of Onset     Hyperlipidemia Mother      Unknown/Adopted Father      DIABETES Paternal Grandmother        SOCIAL HISTORY:  Social History     Social History     Marital status:      Spouse name: N/A     Number of children: N/A     Years of education: N/A     Social History Main Topics     Smoking status: Never Smoker     Smokeless tobacco: Never Used     Alcohol use 0.0 oz/week     0 Standard drinks or equivalent per week      Comment: rare     Drug use: No     Sexual activity: Not Asked     Other Topics Concern     Caffeine Concern Yes     10 oz of coffee per day     Sleep Concern No     Weight Concern Yes     weight gain     Special Diet Yes     gluten free     Exercise Yes     walking 2 miles daily     Social History Narrative       Review of Systems:  Skin:  Negative       Eyes:  Positive for glasses    ENT:  Positive for tinnitus   "  Respiratory:  Positive for shortness of breath     Cardiovascular:    edema;Positive for;palpitations;chest pain    Gastroenterology: Negative      Genitourinary:  Negative      Musculoskeletal:  Negative      Neurologic:  Positive for numbness or tingling of feet right foot   Psychiatric:  Positive for excessive stress work related   Heme/Lymph/Imm:  Negative      Endocrine:  Positive for thyroid disorder      Physical Exam:  Vitals: /72 (BP Location: Right arm, Patient Position: Sitting, Cuff Size: Adult Regular)  Pulse 75  Ht 1.613 m (5' 3.5\")  Wt 91.2 kg (201 lb)  SpO2 95%  BMI 35.05 kg/m2    Constitutional:  cooperative;alert and oriented        Skin:  warm and dry to the touch        Head:  normocephalic        Eyes:  pupils equal and round        ENT:  dentition good        Neck:  JVP normal JVP elevated      Chest:  normal breath sounds, clear to auscultation, normal A-P diameter, normal symmetry, normal respiratory excursion, no use of accessory muscles;healed median sternotomy scar          Cardiac: regular rhythm;normal S1 and S2       systolic murmur          Abdomen:  abdomen soft        Vascular: pulses full and equal                                        Extremities and Back:  no edema;no deformities, clubbing, cyanosis, erythema observed              Neurological:  affect appropriate, oriented to time, person and place;no gross motor deficits              WOLF Dawkins, ERIK CNP  [address] [place] [address]  [place] [id number]            "

## 2017-09-29 NOTE — LETTER
9/29/2017    Aixa [place] MD  [facility]   [address]  [facility] [address]    RE: Marli Nguyen       Dear Colleague,    I had the pleasure of seeing Marli Nguyen in the [facility].    HPI and Plan:   Ms. Nguyen is a very pleasant 57-year-old patient of Dr. Sheppard with a history of coronary artery disease, previous 2-vessel CABG in 2006 with a LIMA graft to the LAD and vein graft to an obtuse marginal 2.  She had direct left main stenting in 2013.  She is a past medical history of Graves disease with medically induced hypothyroidism.     She last saw Dr. Sheppard in March following a visit to primary care clinic for chest pain with radiation down her left arm and into her jaw, workup at her PCP showed normal cardiac enzymes and unremarkable EKG.  Her symptoms were exertional and Dr. Sheppard felt, given her complex cardiac history, a coronary angiogram would be the best way to evaluate progression of her disease. Her coronary angiogram in March failed to demonstrate any flow limiting lesions.  Her LV gram showed EF of 55%.  Her left main was moderately calcified with mild luminal irregularities, her LAD showed minimal luminal irregularities, her left circumflex was dominant with minimal luminal irregularities, she had a small nondominant right coronary arteries with minimal luminal irregularities.  Her LIMA graft was enteric but patent, her saphenous vein graft to OM1 was widely patent with a prominent valve mid graft.  The graft provides competitive flow to the entire left circumflex and LAD circulation.  There was no evidence of subclavian stenosis.  No intervention was performed.  In follow-up her beta blocker was increased and she subsequently had resolution of her symptoms.     I saw Marli in August at the request of her primary care doctor.  At that time she reported a three-week history of daily and sometimes constant angina.  Her symptoms are  worse with exertion, sometimes with shortness of breath, sometimes relieved by nitroglycerin.  At that time her CK was 1.0, her troponin was 0.04, and her BNP was 33.  Her EKG was unchanged. Because she was no longer having symptoms and had recently had a angiogram, I recommended echocardiogram which we reviewed today.  Her echocardiogram showed an EF of 55%, mild pulmonic valvular regurgitation, there were no wall motion abnormalities.  At her last visit she was taking 5 mg of amlodipine in the morning and half a tablet in the evening.  For her angina I increased her amlodipine to 5 mg twice a day.    Today Marli says she's feeling well.  She has on occasion this intermittent chest discomfort but has not had any significant episodes since her last visit.  She thinks the amlodipine has really helped her symptoms.  She works in a dental office, she says her work has actually been very stressful and she wonders if this is causing her cardiac problems, she wonders if her cardiac problems are significant enough to merit disability.  I told her that that would be very unlikely given her echocardiogram and most recent coronary angiogram, but that I would defer to Dr. Sheppard.  She also wonders today if her injection of Prolia could've caused her significant weight gain and she's had over the last year.  She saw her endocrinologist today he apparently hetal several laps.  My research quick online look into Prolia found no evidence for weight gain or endocrine problems as a side effect.    Physical exam  General.  57-year-old woman, appears comfortable  Vital signs.  Blood pressure 124/72, heart rate 75, weight 201 pounds, BMI 35  Lungs are bilaterally clear to auscultation, no wheezing  Cardiac.  Rate regular, normal S1 and S2  Extremities.  No lower extremities edema    Assessment and plan  1. Premature coronary artery disease with two-vessel CABG in 2006 with a LIMA to the LAD and vein graft to OM two followed by left  main stenting in 2013.  Her last ischemic workup in March failed to demonstrate any flow limiting lesions.  Echocardiogram done today demonstrated no wall motion abnormalities.  She does not have ischemic symptoms at this time.  I recommend continued medical management.  2. Hypertension.  Her blood pressure is well-controlled today    Thank you for allowing me to see Marli.  She should follow-up with Dr. Sheppard in six months.       Orders Placed This Encounter   Procedures     Basic metabolic panel     Follow-Up with Cardiologist       Orders Placed This Encounter   Medications     Rosuvastatin Calcium (CRESTOR PO)     Sig: Take 40 mg by mouth daily       Medications Discontinued During This Encounter   Medication Reason     rosuvastatin (CRESTOR) 20 MG tablet Medication Reconciliation Clean Up         Encounter Diagnosis   Name Primary?     Coronary artery disease involving native coronary artery of native heart without angina pectoris        CURRENT MEDICATIONS:  Current Outpatient Prescriptions   Medication Sig Dispense Refill     Rosuvastatin Calcium (CRESTOR PO) Take 40 mg by mouth daily       amLODIPine (NORVASC) 5 MG tablet Take 1 tablet (5 mg) by mouth 2 times daily  1     metoprolol (LOPRESSOR) 50 MG tablet Take 0.5 tablets (25 mg) by mouth 2 times daily 180 tablet 3     liothyronine (CYTOMEL) 5 MCG tablet Take 5 mcg by mouth daily       nitroglycerin (NITROSTAT) 0.4 MG sublingual tablet Place 1 tablet (0.4 mg) under the tongue every 5 minutes as needed 25 tablet 3     isosorbide mononitrate (IMDUR) 60 MG 24 hr tablet Take 1 tablet (60 mg) by mouth daily 90 tablet 2     denosumab (PROLIA) 60 MG/ML SOLN Inject 60 mg Subcutaneous every 6 months       CALCIUM-VITAMIN D PO Take 1 tablet by mouth every evening (strength unknown, does note that calcium is 800 mg)       VITAMIN D, CHOLECALCIFEROL, PO Take 1,000 Units by mouth every evening       MAGNESIUM OXIDE PO Take 200 mg by mouth every evening        COENZYME Q-10 PO Take 100 mg by mouth daily       arginine 500 MG tablet Take 500 mg by mouth 2 times daily        Omega-3 Fatty Acids (OMEGA-3 FISH OIL PO) Take 2 g by mouth 2 times daily        aspirin 81 MG EC tablet Take 162 mg by mouth At Bedtime        Levothyroxine Sodium (SYNTHROID PO) Take 88 mcg by mouth daily        [DISCONTINUED] rosuvastatin (CRESTOR) 20 MG tablet Take 1 tablet (20 mg) by mouth daily (Patient taking differently: Take 40 mg by mouth daily ) 90 tablet 2       ALLERGIES     Allergies   Allergen Reactions     Bactrim [Sulfamethoxazole W/Trimethoprim]      rash     Erythromycin Cramps     Sulfa Drugs      Tape [Adhesive Tape]        PAST MEDICAL HISTORY:  Past Medical History:   Diagnosis Date     Angina pectoris (H) 6/24/2013     Cardiac microvascular disease (H)      Chronic left hip pain      Coronary artery disease     s/p CABG 2006 (LIMA to LAD and saphneous vein graft to OM1 and OM3), and KERRI to left main in 2013     DM type 2 (diabetes mellitus, type 2) (H)      Gallstone pancreatitis 2012     Graves disease     s/p radioiodide therapy in 2001, now takes synthroid     Hyperlipidemia      Hypertension      SIRS (systemic inflammatory response syndrome) (H) 9/8/2012     Unstable angina (H) 10/12/2016       PAST SURGICAL HISTORY:  Past Surgical History:   Procedure Laterality Date     CARPAL TUNNEL RELEASE RT/LT       CHOLECYSTECTOMY  4/12     CORONARY ANGIOGRAPHY ADULT ORDER  11/2006    70% ostial left main lesion, 50-60% mid LAD stenosis prox. to LIMA insertion site, circumflex with widely patent saph vein graft into first obtuse marginal, tiny posterolateral branch occluded     CORONARY ANGIOGRAPHY ADULT ORDER  12/2006    attempted circ OM3 PTCA     CORONARY ANGIOGRAPHY ADULT ORDER  6/2013    KERRI to ostial left main, widely patent LIMA to LAD and saphenous vein graft     CORONARY ANGIOGRAPHY ADULT ORDER  1/2014    left main stent widely patent, LIMA to LAD and vein graft to first  obtuse marginal patent     CORONARY ANGIOGRAPHY ADULT ORDER  10/13/16    Left main:there is evidence of previous stent implantation in the left main with no evidence of restenosis. The distal and mid LAD are free of signigficant narrowing. The bypass graft to the marginal branch is widely patent. The study is unchanged since her last study on 11/14/2014.     CORONARY ARTERY BYPASS  3/2006    LIMA to LAD, sequential SV grafts to OM1 and OM3     DILATION AND CURETTAGE, HYSTEROSCOPY, ABLATE ENDOMETRIUM NOVASURE, COMBINED  6/28/2012    Procedure: COMBINED DILATION AND CURETTAGE, HYSTEROSCOPY, ABLATE ENDOMETRIUM NOVASURE;  DILATION AND CURETTAGE, HYSTEROSCOPY, ABLATE ENDOMETRIUM,pelvic exam under anesthesia;  Surgeon: Johnna Harley MD;  Location: [facility] OR     LAPAROSCOPIC HYSTERECTOMY TOTAL, BILATERAL SALPINGO-OOPHORECTOMY, COMBINED  9/20/2012    Procedure: COMBINED LAPAROSCOPIC HYSTERECTOMY TOTAL, SALPINGO-OOPHORECTOMY;  LAPAROSCOPIC HYSTERECTOMY TOTAL, SALPINGO-OOPHORECTOMY;  Surgeon: Johnna Harley MD;  Location: [facility] OR     LASIK BILATERAL       REPAIR VAGINAL CUFF  10/31/2012    Procedure: REPAIR VAGINAL CUFF;  Irrigation and repair of Vaginal Cuff;  Surgeon: Johnna Harley MD;  Location: [facility] OR     Right thumb mass excision  2008     [place] teeth removal         FAMILY HISTORY:  Family History   Problem Relation Age of Onset     Hyperlipidemia Mother      Unknown/Adopted Father      DIABETES Paternal Grandmother        SOCIAL HISTORY:  Social History     Social History     Marital status:      Spouse name: N/A     Number of children: N/A     Years of education: N/A     Social History Main Topics     Smoking status: Never Smoker     Smokeless tobacco: Never Used     Alcohol use 0.0 oz/week     0 Standard drinks or equivalent per week      Comment: rare     Drug use: No     Sexual activity: Not Asked     Other Topics Concern     Caffeine Concern Yes     10 oz of coffee per day     Sleep Concern No      "Weight Concern Yes     weight gain     Special Diet Yes     gluten free     Exercise Yes     walking 2 miles daily     Social History Narrative       Review of Systems:  Skin:  Negative       Eyes:  Positive for glasses    ENT:  Positive for tinnitus    Respiratory:  Positive for shortness of breath     Cardiovascular:    edema;Positive for;palpitations;chest pain    Gastroenterology: Negative      Genitourinary:  Negative      Musculoskeletal:  Negative      Neurologic:  Positive for numbness or tingling of feet right foot   Psychiatric:  Positive for excessive stress work related   Heme/Lymph/Imm:  Negative      Endocrine:  Positive for thyroid disorder      Physical Exam:  Vitals: /72 (BP Location: Right arm, Patient Position: Sitting, Cuff Size: Adult Regular)  Pulse 75  Ht 1.613 m (5' 3.5\")  Wt 91.2 kg (201 lb)  SpO2 95%  BMI 35.05 kg/m2    Constitutional:  cooperative;alert and oriented        Skin:  warm and dry to the touch        Head:  normocephalic        Eyes:  pupils equal and round        ENT:  dentition good        Neck:  JVP normal JVP elevated      Chest:  normal breath sounds, clear to auscultation, normal A-P diameter, normal symmetry, normal respiratory excursion, no use of accessory muscles;healed median sternotomy scar          Cardiac: regular rhythm;normal S1 and S2       systolic murmur          Abdomen:  abdomen soft        Vascular: pulses full and equal                                        Extremities and Back:  no edema;no deformities, clubbing, cyanosis, erythema observed              Neurological:  affect appropriate, oriented to time, person and place;no gross motor deficits        Thank you for allowing me to participate in the care of your patient.    Sincerely,     ERIK Mclaughlin [facility]    "

## 2017-09-29 NOTE — MR AVS SNAPSHOT
After Visit Summary   9/29/2017    Marli Nguyen    MRN: [id number]           Patient Information     Date Of Birth          1960        Visit Information        Provider Department      9/29/2017 1:30 PM Basia Dawkins APRN CNP [facility] [place]        Today's Diagnoses     Coronary artery disease involving native coronary artery of native heart without angina pectoris           Follow-ups after your visit        Additional Services     Follow-Up with Cardiologist                 Who to contact     If you have questions or need follow up information about today's clinic visit or your schedule please contact [facility] [place] directly at [phone].  Normal or non-critical lab and imaging results will be communicated to you by MyChart, letter or phone within 4 business days after the clinic has received the results. If you do not hear from us within 7 days, please contact the clinic through Inf[employer] or phone. If you have a critical or abnormal lab result, we will notify you by phone as soon as possible.  Submit refill requests through [employer] or call your pharmacy and they will forward the refill request to us. Please allow 3 business days for your refill to be completed.          Additional Information About Your Visit        MyChart Information     [employer] gives you secure access to your electronic health record. If you see a primary care provider, you can also send messages to your care team and make appointments. If you have questions, please call your primary care clinic.  If you do not have a primary care provider, please call [phone] and they will assist you.        Care EveryWhere ID     This is your Care EveryWhere ID. This could be used by other organizations to access your [place] medical records  [id number]        Your Vitals Were     Pulse Height Pulse Oximetry BMI (Body Mass Index)        "   75 1.613 m (5' 3.5\") 95% 35.05 kg/m2         Blood Pressure from Last 3 Encounters:   09/29/17 124/72   08/24/17 127/70   03/15/17 128/84    Weight from Last 3 Encounters:   09/29/17 91.2 kg (201 lb)   08/24/17 88.5 kg (195 lb)   03/15/17 86.8 kg (191 lb 4.8 oz)              We Performed the Following     Follow-Up with Cardiac Advanced Practice Provider        Primary Care Provider Office Phone # Fax #    Aixa Brantley [phone]       [facility] [address] [address]        Equal Access to Services     MAZIN ALLISON : Hadii aad ku hadasho Sobookerali, waaxda luqadaha, qaybta kaalmada adeegyada, waxvance hallin haybetzyn beba crystal. So [facility] [phone].    ATENCIÓN: Si habla español, tiene a griffiths disposición servicios gratuitos de asistencia lingüística. Ll[facility] [phone].    We comply with applicable federal civil rights laws and [place] laws. We do not discriminate on the basis of race, color, national origin, age, disability, sex, sexual orientation, or gender identity.            Thank you!     Thank you for choosing [facility] [place]  for your care. Our goal is always to provide you with excellent care. Hearing back from our patients is one way we can continue to improve our services. Please take a few minutes to complete the written survey that you may receive in the mail after your visit with us. Thank you!             Your Updated Medication List - Protect others around you: Learn how to safely use, store and throw away your medicines at www.disposemymeds.org.          This list is accurate as of: 9/29/17 11:59 PM.  Always use your most recent med list.                   Brand Name Dispense Instructions for use Diagnosis    amLODIPine 5 MG tablet    NORVASC     Take 1 tablet (5 mg) by mouth 2 times daily    Coronary artery disease involving native coronary artery of native heart without angina pectoris       " arginine 500 MG tablet      Take 500 mg by mouth 2 times daily        aspirin 81 MG EC tablet      Take 162 mg by mouth At Bedtime        CALCIUM-VITAMIN D PO      Take 1 tablet by mouth every evening (strength unknown, does note that calcium is 800 mg)        COENZYME Q-10 PO      Take 100 mg by mouth daily        CRESTOR PO      Take 40 mg by mouth daily        CYTOMEL 5 MCG tablet   Generic drug:  liothyronine      Take 5 mcg by mouth daily        isosorbide mononitrate 60 MG 24 hr tablet    IMDUR    90 tablet    Take 1 tablet (60 mg) by mouth daily    CAD (coronary artery disease)       MAGNESIUM OXIDE PO      Take 200 mg by mouth every evening        metoprolol 50 MG tablet    LOPRESSOR    180 tablet    Take 0.5 tablets (25 mg) by mouth 2 times daily    Coronary artery disease involving native coronary artery of native heart with angina pectoris (H)       nitroGLYcerin 0.4 MG sublingual tablet    NITROSTAT    25 tablet    Place 1 tablet (0.4 mg) under the tongue every 5 minutes as needed    Unstable angina (H), Coronary artery disease involving native coronary artery of native heart without angina pectoris       OMEGA-3 FISH OIL PO      Take 2 g by mouth 2 times daily        PROLIA 60 MG/ML Soln injection   Generic drug:  denosumab      Inject 60 mg Subcutaneous every 6 months        SYNTHROID PO      Take 88 mcg by mouth daily        VITAMIN D (CHOLECALCIFEROL) PO      Take 1,000 Units by mouth every evening

## 2017-10-10 DIAGNOSIS — I25.10 CAD (CORONARY ARTERY DISEASE): ICD-10-CM

## 2017-10-10 RX ORDER — ISOSORBIDE MONONITRATE 60 MG/1
60 TABLET, EXTENDED RELEASE ORAL DAILY
Qty: 90 TABLET | Refills: 1 | Status: SHIPPED | OUTPATIENT
Start: 2017-10-10 | End: 2018-04-10

## 2017-11-29 DIAGNOSIS — I25.10 CORONARY ARTERY DISEASE INVOLVING NATIVE CORONARY ARTERY OF NATIVE HEART WITHOUT ANGINA PECTORIS: ICD-10-CM

## 2017-11-29 RX ORDER — AMLODIPINE BESYLATE 5 MG/1
5 TABLET ORAL 2 TIMES DAILY
Qty: 180 TABLET | Refills: 2 | Status: SHIPPED | OUTPATIENT
Start: 2017-11-29 | End: 2018-02-20

## 2017-12-12 ENCOUNTER — TELEPHONE (OUTPATIENT)
Dept: CARDIOLOGY | Facility: CLINIC | Age: 57
End: 2017-12-12

## 2017-12-12 DIAGNOSIS — I25.10 CAD (CORONARY ARTERY DISEASE): Primary | ICD-10-CM

## 2017-12-12 RX ORDER — ARGININE 500 MG
500 TABLET ORAL 2 TIMES DAILY
Qty: 180 TABLET | Refills: 3 | Status: SHIPPED | OUTPATIENT
Start: 2017-12-12

## 2017-12-12 NOTE — TELEPHONE ENCOUNTER
Pt called to report that she has been out of her l-arginine for a week. She realized it when she started to have brief episodes of chest pain today. Pt has had to take an SL nitro each time and it quickly relieves the pain. Script called into [facility] pharmacy but they are out of stock of it. Her [facility] pharmacy is also out of it. Pt said she will try to get it over the counter at [facility]. She may try to take an extra 30 mg of her imdur tonight but is aware not to take if her bp is low. Pt aware to go to ED if she develops more severe and prolonged episodes of chest pain. I confirmed with  that it is ok for her to take an extra 30 mg of her imdur tonight. I left message with pt to let her know. Arginine script sent to her [facility] pharmacy and they will order it tonight so pt can  tomorrow.  Dylan HASSAN

## 2018-02-20 DIAGNOSIS — I25.119 CORONARY ARTERY DISEASE INVOLVING NATIVE CORONARY ARTERY OF NATIVE HEART WITH ANGINA PECTORIS (H): ICD-10-CM

## 2018-02-20 DIAGNOSIS — I25.10 CORONARY ARTERY DISEASE INVOLVING NATIVE CORONARY ARTERY OF NATIVE HEART WITHOUT ANGINA PECTORIS: ICD-10-CM

## 2018-02-20 RX ORDER — AMLODIPINE BESYLATE 5 MG/1
5 TABLET ORAL 2 TIMES DAILY
Qty: 180 TABLET | Refills: 2 | Status: SHIPPED | OUTPATIENT
Start: 2018-02-20 | End: 2018-10-23

## 2018-02-20 RX ORDER — METOPROLOL TARTRATE 25 MG/1
25 TABLET, FILM COATED ORAL 2 TIMES DAILY
Qty: 180 TABLET | Refills: 2 | Status: SHIPPED | OUTPATIENT
Start: 2018-02-20 | End: 2018-11-20

## 2018-02-20 NOTE — TELEPHONE ENCOUNTER
Patient left  requesting refills for amlodipine and lopressor. Requesting to change the tablet strength of lopressor to 25mg tab so she doesn't have to split the 50 mg tab (dose is 25mg BID). Also states her amlodipine said 5mg daily, but that had been increased to 5mg BID awhile ago. Verified both Rx and sent new refills to her pharmacy with updated tablet strength and instructions. Called patient back to let her know this has been sent to her pharmacy.

## 2018-04-10 DIAGNOSIS — I25.10 CAD (CORONARY ARTERY DISEASE): ICD-10-CM

## 2018-04-10 RX ORDER — ISOSORBIDE MONONITRATE 60 MG/1
60 TABLET, EXTENDED RELEASE ORAL DAILY
Qty: 90 TABLET | Refills: 3 | Status: SHIPPED | OUTPATIENT
Start: 2018-04-10 | End: 2018-04-11

## 2018-04-11 DIAGNOSIS — I25.10 CAD (CORONARY ARTERY DISEASE): ICD-10-CM

## 2018-04-11 RX ORDER — ISOSORBIDE MONONITRATE 60 MG/1
60 TABLET, EXTENDED RELEASE ORAL DAILY
Qty: 90 TABLET | Refills: 0 | Status: SHIPPED | OUTPATIENT
Start: 2018-04-11 | End: 2019-04-01

## 2018-04-16 ENCOUNTER — APPOINTMENT (OUTPATIENT)
Dept: GENERAL RADIOLOGY | Facility: CLINIC | Age: 58
End: 2018-04-16
Attending: EMERGENCY MEDICINE
Payer: COMMERCIAL

## 2018-04-16 ENCOUNTER — HOSPITAL ENCOUNTER (OUTPATIENT)
Facility: CLINIC | Age: 58
Setting detail: OBSERVATION
Discharge: HOME | End: 2018-04-17
Attending: EMERGENCY MEDICINE | Admitting: HOSPITALIST
Payer: COMMERCIAL

## 2018-04-16 DIAGNOSIS — R07.9 ACUTE CHEST PAIN: ICD-10-CM

## 2018-04-16 LAB
ANION GAP SERPL CALCULATED.3IONS-SCNC: 8 MMOL/L (ref 3–14)
BASOPHILS # BLD AUTO: 0.1 10E9/L (ref 0–0.2)
BASOPHILS NFR BLD AUTO: 0.6 %
BUN SERPL-MCNC: 14 MG/DL (ref 7–30)
CALCIUM SERPL-MCNC: 9.9 MG/DL (ref 8.5–10.1)
CHLORIDE SERPL-SCNC: 103 MMOL/L (ref 94–109)
CO2 SERPL-SCNC: 27 MMOL/L (ref 20–32)
CREAT SERPL-MCNC: 0.82 MG/DL (ref 0.52–1.04)
DIFFERENTIAL METHOD BLD: NORMAL
EOSINOPHIL # BLD AUTO: 0.2 10E9/L (ref 0–0.7)
EOSINOPHIL NFR BLD AUTO: 2 %
ERYTHROCYTE [DISTWIDTH] IN BLOOD BY AUTOMATED COUNT: 13.2 % (ref 10–15)
GFR SERPL CREATININE-BSD FRML MDRD: 72 ML/MIN/1.7M2
GLUCOSE SERPL-MCNC: 111 MG/DL (ref 70–99)
HCT VFR BLD AUTO: 46 % (ref 35–47)
HGB BLD-MCNC: 15.5 G/DL (ref 11.7–15.7)
IMM GRANULOCYTES # BLD: 0 10E9/L (ref 0–0.4)
IMM GRANULOCYTES NFR BLD: 0.5 %
INTERPRETATION ECG - MUSE: NORMAL
LYMPHOCYTES # BLD AUTO: 1.8 10E9/L (ref 0.8–5.3)
LYMPHOCYTES NFR BLD AUTO: 22.5 %
MCH RBC QN AUTO: 30.6 PG (ref 26.5–33)
MCHC RBC AUTO-ENTMCNC: 33.7 G/DL (ref 31.5–36.5)
MCV RBC AUTO: 91 FL (ref 78–100)
MONOCYTES # BLD AUTO: 0.6 10E9/L (ref 0–1.3)
MONOCYTES NFR BLD AUTO: 7.1 %
NEUTROPHILS # BLD AUTO: 5.4 10E9/L (ref 1.6–8.3)
NEUTROPHILS NFR BLD AUTO: 67.3 %
NRBC # BLD AUTO: 0 10*3/UL
NRBC BLD AUTO-RTO: 0 /100
PLATELET # BLD AUTO: 237 10E9/L (ref 150–450)
POTASSIUM SERPL-SCNC: 4.3 MMOL/L (ref 3.4–5.3)
RBC # BLD AUTO: 5.07 10E12/L (ref 3.8–5.2)
SODIUM SERPL-SCNC: 138 MMOL/L (ref 133–144)
TROPONIN I BLD-MCNC: 0 UG/L (ref 0–0.1)
TROPONIN I SERPL-MCNC: <0.015 UG/L (ref 0–0.04)
WBC # BLD AUTO: 8 10E9/L (ref 4–11)

## 2018-04-16 PROCEDURE — 93005 ELECTROCARDIOGRAM TRACING: CPT

## 2018-04-16 PROCEDURE — 84484 ASSAY OF TROPONIN QUANT: CPT | Performed by: EMERGENCY MEDICINE

## 2018-04-16 PROCEDURE — 71046 X-RAY EXAM CHEST 2 VIEWS: CPT

## 2018-04-16 PROCEDURE — 36415 COLL VENOUS BLD VENIPUNCTURE: CPT | Performed by: PHYSICIAN ASSISTANT

## 2018-04-16 PROCEDURE — 25000132 ZZH RX MED GY IP 250 OP 250 PS 637: Performed by: EMERGENCY MEDICINE

## 2018-04-16 PROCEDURE — 40000275 ZZH STATISTIC RCP TIME EA 10 MIN

## 2018-04-16 PROCEDURE — 96376 TX/PRO/DX INJ SAME DRUG ADON: CPT

## 2018-04-16 PROCEDURE — 96374 THER/PROPH/DIAG INJ IV PUSH: CPT

## 2018-04-16 PROCEDURE — 96375 TX/PRO/DX INJ NEW DRUG ADDON: CPT

## 2018-04-16 PROCEDURE — 93010 ELECTROCARDIOGRAM REPORT: CPT | Performed by: INTERNAL MEDICINE

## 2018-04-16 PROCEDURE — 84484 ASSAY OF TROPONIN QUANT: CPT

## 2018-04-16 PROCEDURE — 99220 ZZC INITIAL OBSERVATION CARE,LEVL III: CPT | Performed by: PHYSICIAN ASSISTANT

## 2018-04-16 PROCEDURE — 80048 BASIC METABOLIC PNL TOTAL CA: CPT | Performed by: EMERGENCY MEDICINE

## 2018-04-16 PROCEDURE — 84484 ASSAY OF TROPONIN QUANT: CPT | Performed by: PHYSICIAN ASSISTANT

## 2018-04-16 PROCEDURE — 85025 COMPLETE CBC W/AUTO DIFF WBC: CPT | Performed by: EMERGENCY MEDICINE

## 2018-04-16 PROCEDURE — G0378 HOSPITAL OBSERVATION PER HR: HCPCS

## 2018-04-16 PROCEDURE — 25000132 ZZH RX MED GY IP 250 OP 250 PS 637: Performed by: PHYSICIAN ASSISTANT

## 2018-04-16 PROCEDURE — 25000128 H RX IP 250 OP 636: Performed by: EMERGENCY MEDICINE

## 2018-04-16 PROCEDURE — 25000128 H RX IP 250 OP 636: Performed by: PHYSICIAN ASSISTANT

## 2018-04-16 PROCEDURE — 99285 EMERGENCY DEPT VISIT HI MDM: CPT

## 2018-04-16 RX ORDER — ACETAMINOPHEN 325 MG/1
650 TABLET ORAL EVERY 4 HOURS PRN
Status: DISCONTINUED | OUTPATIENT
Start: 2018-04-16 | End: 2018-04-17 | Stop reason: HOSPADM

## 2018-04-16 RX ORDER — ASPIRIN 81 MG/1
324 TABLET, CHEWABLE ORAL ONCE
Status: COMPLETED | OUTPATIENT
Start: 2018-04-16 | End: 2018-04-16

## 2018-04-16 RX ORDER — LIOTHYRONINE SODIUM 5 UG/1
5 TABLET ORAL DAILY
Status: DISCONTINUED | OUTPATIENT
Start: 2018-04-17 | End: 2018-04-17 | Stop reason: HOSPADM

## 2018-04-16 RX ORDER — ACETAMINOPHEN 650 MG/1
650 SUPPOSITORY RECTAL EVERY 4 HOURS PRN
Status: DISCONTINUED | OUTPATIENT
Start: 2018-04-16 | End: 2018-04-17 | Stop reason: HOSPADM

## 2018-04-16 RX ORDER — ALUMINA, MAGNESIA, AND SIMETHICONE 2400; 2400; 240 MG/30ML; MG/30ML; MG/30ML
30 SUSPENSION ORAL EVERY 4 HOURS PRN
Status: DISCONTINUED | OUTPATIENT
Start: 2018-04-16 | End: 2018-04-17 | Stop reason: HOSPADM

## 2018-04-16 RX ORDER — MORPHINE SULFATE 4 MG/ML
4 INJECTION, SOLUTION INTRAMUSCULAR; INTRAVENOUS
Status: DISCONTINUED | OUTPATIENT
Start: 2018-04-16 | End: 2018-04-16

## 2018-04-16 RX ORDER — AMLODIPINE BESYLATE 5 MG/1
5 TABLET ORAL 2 TIMES DAILY
Status: DISCONTINUED | OUTPATIENT
Start: 2018-04-16 | End: 2018-04-17 | Stop reason: HOSPADM

## 2018-04-16 RX ORDER — NALOXONE HYDROCHLORIDE 0.4 MG/ML
.1-.4 INJECTION, SOLUTION INTRAMUSCULAR; INTRAVENOUS; SUBCUTANEOUS
Status: DISCONTINUED | OUTPATIENT
Start: 2018-04-16 | End: 2018-04-17 | Stop reason: HOSPADM

## 2018-04-16 RX ORDER — MORPHINE SULFATE 2 MG/ML
2 INJECTION, SOLUTION INTRAMUSCULAR; INTRAVENOUS
Status: DISCONTINUED | OUTPATIENT
Start: 2018-04-16 | End: 2018-04-17 | Stop reason: HOSPADM

## 2018-04-16 RX ORDER — ISOSORBIDE MONONITRATE 60 MG/1
60 TABLET, EXTENDED RELEASE ORAL DAILY
Status: DISCONTINUED | OUTPATIENT
Start: 2018-04-17 | End: 2018-04-17 | Stop reason: HOSPADM

## 2018-04-16 RX ORDER — ASPIRIN 81 MG/1
81 TABLET ORAL DAILY
Status: DISCONTINUED | OUTPATIENT
Start: 2018-04-17 | End: 2018-04-16

## 2018-04-16 RX ORDER — LEVOTHYROXINE SODIUM 88 UG/1
88 TABLET ORAL
Status: DISCONTINUED | OUTPATIENT
Start: 2018-04-17 | End: 2018-04-17 | Stop reason: HOSPADM

## 2018-04-16 RX ORDER — ROSUVASTATIN CALCIUM 20 MG/1
40 TABLET, COATED ORAL DAILY
Status: DISCONTINUED | OUTPATIENT
Start: 2018-04-17 | End: 2018-04-17 | Stop reason: HOSPADM

## 2018-04-16 RX ORDER — METOPROLOL TARTRATE 25 MG/1
25 TABLET, FILM COATED ORAL 2 TIMES DAILY
Status: DISCONTINUED | OUTPATIENT
Start: 2018-04-16 | End: 2018-04-17 | Stop reason: HOSPADM

## 2018-04-16 RX ORDER — LIDOCAINE 40 MG/G
CREAM TOPICAL
Status: DISCONTINUED | OUTPATIENT
Start: 2018-04-16 | End: 2018-04-17 | Stop reason: HOSPADM

## 2018-04-16 RX ORDER — ONDANSETRON 2 MG/ML
4 INJECTION INTRAMUSCULAR; INTRAVENOUS EVERY 30 MIN PRN
Status: DISCONTINUED | OUTPATIENT
Start: 2018-04-16 | End: 2018-04-16

## 2018-04-16 RX ORDER — ARGININE 500 MG
500 TABLET ORAL
Status: DISCONTINUED | OUTPATIENT
Start: 2018-04-16 | End: 2018-04-17 | Stop reason: HOSPADM

## 2018-04-16 RX ORDER — NITROGLYCERIN 0.4 MG/1
0.4 TABLET SUBLINGUAL EVERY 5 MIN PRN
Status: DISCONTINUED | OUTPATIENT
Start: 2018-04-16 | End: 2018-04-17 | Stop reason: HOSPADM

## 2018-04-16 RX ORDER — ASPIRIN 81 MG/1
162 TABLET ORAL AT BEDTIME
Status: DISCONTINUED | OUTPATIENT
Start: 2018-04-16 | End: 2018-04-17 | Stop reason: HOSPADM

## 2018-04-16 RX ADMIN — MORPHINE SULFATE 4 MG: 4 INJECTION INTRAVENOUS at 14:52

## 2018-04-16 RX ADMIN — AMLODIPINE BESYLATE 5 MG: 5 TABLET ORAL at 23:55

## 2018-04-16 RX ADMIN — MORPHINE SULFATE 2 MG: 2 INJECTION, SOLUTION INTRAMUSCULAR; INTRAVENOUS at 19:27

## 2018-04-16 RX ADMIN — MORPHINE SULFATE 4 MG: 4 INJECTION INTRAVENOUS at 15:28

## 2018-04-16 RX ADMIN — ONDANSETRON 4 MG: 2 INJECTION INTRAMUSCULAR; INTRAVENOUS at 14:51

## 2018-04-16 RX ADMIN — ASPIRIN 81 MG 324 MG: 81 TABLET ORAL at 14:40

## 2018-04-16 ASSESSMENT — ENCOUNTER SYMPTOMS
NERVOUS/ANXIOUS: 1
NECK PAIN: 1
ABDOMINAL PAIN: 1
NAUSEA: 1
NUMBNESS: 0
SHORTNESS OF BREATH: 1

## 2018-04-16 NOTE — PHARMACY-ADMISSION MEDICATION HISTORY
Admission medication history interview status for this patient is complete. See [facility] admission navigator for allergy information, prior to admission medications and immunization status.     Medication history interview source(s):Patient  Medication history resources (including written lists, pill bottles, clinic record):written list    Changes made to PTA medication list:  Added: zetia, vitamin K complex OTC  Deleted: none  Changed: magnesium, fish oil, vitamin D    Actions taken by pharmacist (provider contacted, etc):None     Additional medication history information:None    Medication reconciliation/reorder completed by provider prior to medication history? No    For patients on insulin therapy: no (Yes/No)   Lantus/levemir/NPH/Mix 70/30 dose: ___ in AM/PM or twice daily   Sliding scale Novolog Y/N   If Yes, do you have a baseline novolog pre-meal dose: ______units with meals   Patients eat three meals a day: Y/N ---  How many episodes of hypoglycemia (low blood glucose) do you have weekly: ---   How many missed doses do you have a week: ---  How many times do you check your blood glucose per day: ---  Any Barriers to therapy: cost of medications/comfortable with giving injections (if applicable)/ comfortable and confident with current diabetes regimen ---      Prior to Admission medications    Medication Sig Last Dose Taking? Auth Provider   Ezetimibe (ZETIA PO) Take 10 mg by mouth every morning 4/16/2018 at am Yes Unknown, Entered By History   NONFORMULARY Take 1 tablet by mouth every morning Vitamin K complex 150mcg OTC 4/16/2018 at am Yes Unknown, Entered By History   isosorbide mononitrate (IMDUR) 60 MG 24 hr tablet Take 1 tablet (60 mg) by mouth daily 4/16/2018 at am Yes Basia Dawikns APRN CNP   amLODIPine (NORVASC) 5 MG tablet Take 1 tablet (5 mg) by mouth 2 times daily 4/16/2018 at am Yes Joana Sheppard DO   metoprolol tartrate (LOPRESSOR) 25 MG tablet Take 1 tablet (25 mg) by  mouth 2 times daily 4/16/2018 at am Yes Joana Sheppard DO   arginine 500 MG tablet Take 1 tablet (500 mg) by mouth 2 times daily 4/16/2018 at am Yes Joana Sheppard DO   Rosuvastatin Calcium (CRESTOR PO) Take 40 mg by mouth daily 4/16/2018 at am Yes Reported, Patient   liothyronine (CYTOMEL) 5 MCG tablet Take 5 mcg by mouth daily 4/16/2018 at am Yes Reported, Patient   nitroglycerin (NITROSTAT) 0.4 MG sublingual tablet Place 1 tablet (0.4 mg) under the tongue every 5 minutes as needed  Yes Joana Sheppard DO   CALCIUM-VITAMIN D PO Take 1 tablet by mouth every evening (strength unknown, does note that calcium is 800 mg) 4/15/2018 at Unknown time Yes Unknown, Entered By History   VITAMIN D, CHOLECALCIFEROL, PO Take 5,000 Units by mouth every evening  4/15/2018 at Unknown time Yes Unknown, Entered By History   MAGNESIUM OXIDE PO Take 500 mg by mouth every evening  4/15/2018 at Unknown time Yes Unknown, Entered By History   COENZYME Q-10 PO Take 100 mg by mouth daily 4/16/2018 at am Yes Reported, Patient   Omega-3 Fatty Acids (OMEGA-3 FISH OIL PO) Take 1.2 g by mouth 2 times daily  4/16/2018 at am Yes Reported, Patient   aspirin 81 MG EC tablet Take 162 mg by mouth At Bedtime  4/15/2018 at Unknown time Yes Reported, Patient   Levothyroxine Sodium (SYNTHROID PO) Take 88 mcg by mouth daily  4/16/2018 at am Yes Reported, Patient

## 2018-04-16 NOTE — ED PROVIDER NOTES
History     Chief Complaint:  Chest Pain      HPI   Marli Nguyen is a 58 year old female with a history of Graves disease and CAD, status post CABG for 3 vessels in 2006 and left main stent placement in 2013, who presents to the emergency department with her  for evaluation of chest pain. The patient had an episode of chest pain in March of 2017, and had a left heart cath on 03/02/17 to investigate this, results as below. Her current episode of chest pain is similar per the patient, but more intense. She recalls an episode of chest pain four days ago while sitting at work. The patient took two Nitro, which resolved her pain. Yesterday the patient reports another episode of mild chest pain.     Then today at about 1200, she was sitting at work when she began to have a third episode of pain and pressure in the center of her chest. This was accompanied by aching of her left arm which radiated up into the left side of her neck and into her left shoulder. She also notes feeling a lot of anxiety with her chest pain, which she recalls is similar to anxious feelings in the past when she was first diagnosed with CAD. The patient took 2 Nitro while still at work, and then another Nitro while waiting in the lobby here in the ER. These have decreased her pain from about a 9/10 in severity to 5/10. Here she admits to slight shortness of breath and nausea, as well as mild left upper quadrant abdominal pain. The patient has history of cholecystectomy. She denies any tearing or ripping sensation, swelling or pain in legs, or numbness apart from residual numbness in her left leg from a past spinal fusion.    Left Heart Cath (03/02/2017)  SUMMARY:   --Unstable angina with pain on the day of this exam  --Two vessel coronary artery disease with prior CABG to the LAD and  LCx.  However, there is no current significant stenosis in the LM,  LAD, or LCx circulation.  The RCA is a small non-dominant vessel  without significant  lesions.  --Left ventricle with LVEDP of 15 mmHg, visually estimated LVEF of  55%, no evidence of mitral regurgitation and no evidence of aortic  stenosis.  --No evidence of subclavian artery disease with normal angiogram    Allergies:  Bactrim [Sulfamethoxazole W/Trimethoprim]  Erythromycin  Sulfa Drugs  Tape [Adhesive Tape]     Medications:    2 baby aspirin at night  Imdur  Norvasc  Lopressor  Crestor  Cytomel  Nitrostat  Magnesium oxide  Synthroid    Past Medical History:    Angina pectoris    Cardiac microvascular disease   Chronic left hip pain   Coronary artery disease   DM type 2 (diabetes mellitus, type 2)    Gallstone pancreatitis   Graves disease   Hyperlipidemia   Hypertension   SIRS (systemic inflammatory response syndrome)    Unstable angina     Past Surgical History:    Carpal tunnel release  Cholecystectomy   Coronary angiography x 5  Coronary artery bypass (2006)  Dilation and curettage, hysteroscopy, ablate endometrium novasure, combined  Total laparoscopic hysterectomy, bilateral SO, combined  Bilateral Lasik  Repair vaginal cuff  Right thumb mass excision  [place] teeth removal     Family History:    Hyperlipidemia  Mother  Diabetes  Paternal Grandmother    Social History:  The patient was accompanied to the emergency department by her .  Smoking Status: Never Smoker  Smokeless Tobacco: Never Used  Alcohol Use: Yes (Rarely)  Marital Status:      Review of Systems   Respiratory: Positive for shortness of breath.    Cardiovascular: Positive for chest pain. Negative for leg swelling.   Gastrointestinal: Positive for abdominal pain and nausea.   Musculoskeletal: Positive for neck pain.        Left arm pain   Neurological: Negative for numbness.   Psychiatric/Behavioral: The patient is nervous/anxious.    All other systems reviewed and are negative.    Physical Exam     Patient Vitals for the past 24 hrs:   BP Temp Temp src Pulse Heart Rate Resp SpO2 Height Weight   04/16/18 1742 124/48  "97.6  F (36.4  C) Oral - 49 18 93 % 1.6 m (5' 3\") 93 kg (205 lb)   04/16/18 1615 111/61 - - - 51 12 95 % - -   04/16/18 1600 118/61 - - - 52 14 92 % - -   04/16/18 1545 124/65 - - - 52 11 93 % - -   04/16/18 1530 115/62 - - - 50 14 95 % - -   04/16/18 1500 125/71 - - - - - - - -   04/16/18 1445 - - - - 61 22 94 % - -   04/16/18 1331 117/78 97.5  F (36.4  C) Temporal 61 - 16 97 % - 93 kg (205 lb)       Physical Exam  Constitutional: Alert, attentive, GCS 15, middle aged woman appears uncomfortable, sitting up in bed  HENT: normocephalic, atraumatic   Eyes: Normal conjunctiva. Pupils are equal, round, and reactive to light.  CV: regular rate and rhythm; no murmurs, rubs or gallups  Chest: Effort normal and breath sounds normal.  No wheezing, rhonchi, or rales.    GI:  There is no tenderness to deep palpation. No distension. Normal bowel sounds.   MSK: Normal range of motion, no peripheral edema, no calf tenderness to palpation.    Neurological: Alert, attentive, oriented x4   Skin: Skin is warm and dry.    Emergency Department Course     ECG:  ECG taken at 1328, ECG read at 1440  Sinus bradycardia  Old T wave inversion V2  No significant change compared to ECG dated 08/22/2017.  Rate 54 bpm. OK interval 140 ms. QRS duration 94 ms. QT/QTc 450/426 ms. P-R-T axes 38 37 53.    Imaging:  Radiology findings were communicated with the patient who voiced understanding of the findings.    XR Chest 2 Views  No acute cardiopulmonary abnormality.  Reading per radiology.     Laboratory:  Laboratory findings were communicated with the patient who voiced understanding of the findings.    Troponin POCT (Collected 1449): 0.00   Troponin (Collected 1443): <0.015   CBC: WBC 8.0, HGB 15.5,   BMP: Glucose 111 (H) o/w WNL (Creatinine 0.82)    Interventions:  1440 Aspirin 324 mg PO   1451 Zofran 4 mg IV   1528 Morphine 4 mg IV     Emergency Department Course:     Nursing notes and vitals reviewed.     I performed an exam of the " patient as documented above.     IV was inserted and blood was drawn for laboratory testing, results above.     The patient was sent for a chest x-ray while in the emergency department, results above.    1536 I spoke with Dr. Rivera of cardiology regarding the patient's presentation, findings, and plan of care.     1607 I discussed the treatment plan with the patient. They expressed understanding of this plan and consented to admission. I discussed the patient with Dr. Coelho, who will admit the patient to a monitored bed for further evaluation and treatment.    1650 I reevaluated and updated the patient. She is now chest pain free.    I personally reviewed the laboratory, imaging, and EKG results with the patient and answered all related questions prior to admission.    Impression & Plan      Medical Decision Making:  Marli Nguyen is a 58 year old female with known CAD s/p multiple CABG and stents, who presents for evaluation of chest pain.    A broad differential was of course considered.  Certainly ischemia is in differential. I doubt pulmonary embolism, aortic dissection, pneumonia or pneumothorax.  Other etiologies of chest pain considered in this patient included chest wall source, esophageal spasm or GI source, pleuritis, referred pain, etc.      I discussed with Dr. Rivera from Cardiology, and based on EKG/troponin and last years coronary cath results, he did not recommend starting heparin for unstable anginal.  HEART score is moderate risk at 5.  She is chest pain free after home nitroglycerin and IV morphine here. Given the nature and timing of the patient's symptoms, I will admit the patient  to the hospital for further workup and treatment with serial troponins and stress test tomorrow.  The patient agrees to be admitted and all questions were answered.      Diagnosis:    ICD-10-CM    1. Acute chest pain R07.9 Troponin I     Basic metabolic panel     Disposition:   The patient was admitted into the care of  Dr. Coelho for further evaluation and management.     Scribe Disclosure:  I, Jane Arreola, am serving as a scribe at 2:28 PM on 4/16/2018 to document services personally performed by Manisha Givens MD based on my observations and the provider's statements to me.        [facility] EMERGENCY DEPARTMENT       Manisha Givens MD  04/16/18 9439

## 2018-04-16 NOTE — PLAN OF CARE
Problem: Patient Care Overview  Goal: Plan of Care/Patient Progress Review  ROOM # 202-1    Living Situation (if not independent, order SW consult): IND with family  Facility name: N/A  : , Adal[place] [phone]    Activity level at baseline: IND  Activity level on admit: SBA      Patient registered to observation; given Patient Bill of Rights; given the opportunity to ask questions about observation status and their plan of care.  Patient has been oriented to the observation room, bathroom and call light is in place.    Discussed discharge goals and expectations with patient/family.

## 2018-04-16 NOTE — IP AVS SNAPSHOT
[facility] Observation Department    [address]    [facility] [address]    Phone:  [phone]                                       After Visit Summary   4/16/2018    Marli Nguyen    MRN: [id number]           After Visit Summary Signature Page     I have received my discharge instructions, and my questions have been answered. I have discussed any challenges I see with this plan with the nurse or doctor.    ..........................................................................................................................................  Patient/Patient Representative Signature      ..........................................................................................................................................  Patient Representative Print Name and Relationship to Patient    ..................................................               ................................................  Date                                            Time    ..........................................................................................................................................  Reviewed by Signature/Title    ...................................................              ..............................................  Date                                                            Time

## 2018-04-16 NOTE — IP AVS SNAPSHOT
MRN:[id number]                      After Visit Summary   4/16/2018    Marli Nguyen    MRN: [id number]           Thank you!     Thank you for choosing [facility] for your care. Our goal is always to provide you with excellent care. Hearing back from our patients is one way we can continue to improve our services. Please take a few minutes to complete the written survey that you may receive in the mail after you visit. If you would like to speak to someone directly about your visit please contact Patient Relations at [phone]. Thank you!          Patient Information     Date Of Birth          1960        About your hospital stay     You were admitted on:  April 16, 2018 You last received care in the:  [facility] Observation Department    You were discharged on:  April 17, 2018        Reason for your hospital stay       Chest pain. Serial troponins were negative and stress echocardiogram was negative.                  Who to Call     For medical emergencies, please call 911.  For non-urgent questions about your medical care, please call your primary care provider or clinic, [phone]          Attending Provider     Provider Specialty    Manisha Givens MD Emergency Medicine    Young, Wilils Freitas MD Internal Medicine       Primary Care Provider Office Phone # Fax #    Aixa Brantley [phone]       When to contact your care team       Call your primary doctor if you have any of the following: temperature greater than 101, increased shortness of breath or increased chest pain.                  After Care Instructions     Activity       Your activity upon discharge: activity as tolerated            Diet       Follow this diet upon discharge: Regular                  Follow-up Appointments     Follow-up and recommended labs and tests        Follow up with primary care provider, Aixa Brantley MD, within 7 days for hospital follow- up.  " No follow up labs or test are needed.  Follow up with cardiology as previously scheduled.                  Your next 10 appointments already scheduled     May 08, 2018  3:45 PM CDT   LAB with RU LAB   [facility] PHYSIC[facility]ANS [facility] ([facility])    [address] [place] [address] [address]  [facility] [address]   [phone]           Please do not eat 10-12 hours before your appointment if you are coming in fasting for labs on lipids, cholesterol, or glucose (sugar). This does not apply to pregnant women. Water, hot tea and black coffee (with nothing added) are okay. Do not drink other fluids, diet soda or chew gum.            May 08, 2018  4:45 PM CDT   Return Visit with Joana Sheppard DO   [facility] ([facility])    [address] [facility] [address]  [facility] [address] [phone]                         Pending Results     No orders found for last 3 day(s).            Statement of Approval     Ordered          04/17/18 0829  I have reviewed and agree with all the recommendations and orders detailed in this document.  EFFECTIVE NOW     Approved and electronically signed by:  Meka Manjarrez PA-C             Admission Information     Date & Time Provider Department Dept. Phone    4/16/2018 Willis Coelho MD [facility] Observation Department [phone]      Your Vitals Were     Blood Pressure Pulse Temperature Respirations Height Weight    110/57 (BP Location: Left arm) 61 97.3  F (36.3  C) (Oral) 16 1.6 m (5' 3\") 93 kg (205 lb)    Pulse Oximetry BMI (Body Mass Index)                91% 36.31 kg/m2          MyChart Information     Remote gives you secure access to your electronic health record. If you see a primary care provider, you can also send messages to your care team and make appointments. If you have questions, please call your primary care clinic.  If you do not have a primary care provider, " please call [phone] and they will assist you.        Care EveryWhere ID     This is your Care EveryWhere ID. This could be used by other organizations to access your [facility] medical records  [id number]        Equal Access to Services     MAZIN ALLISON : Hadii aad ku hadbikathya Yoliebetty, waaxda luqadaha, qaybta kaalmada april, anabella rafaelin hayaahardeep yoder broelle crystal. So [facility] [phone].    ATENCIÓN: Si habla español, tiene a griffiths disposición servicios gratuitos de asistencia lingüística. Llame al [phone].    We comply with applicable federal civil rights laws and [place] laws. We do not discriminate on the basis of race, color, national origin, age, disability, sex, sexual orientation, or gender identity.               Review of your medicines      CONTINUE these medicines which have NOT CHANGED        Dose / Directions    amLODIPine 5 MG tablet   Commonly known as:  NORVASC   Used for:  Coronary artery disease involving native coronary artery of native heart without angina pectoris        Dose:  5 mg   Take 1 tablet (5 mg) by mouth 2 times daily   Quantity:  180 tablet   Refills:  2       arginine 500 MG tablet   Used for:  CAD (coronary artery disease)        Dose:  500 mg   Take 1 tablet (500 mg) by mouth 2 times daily   Quantity:  180 tablet   Refills:  3       aspirin 81 MG EC tablet        Dose:  162 mg   Take 162 mg by mouth At Bedtime   Refills:  0       CALCIUM-VITAMIN D PO        Dose:  1 tablet   Take 1 tablet by mouth every evening (strength unknown, does note that calcium is 800 mg)   Refills:  0       COENZYME Q-10 PO        Dose:  100 mg   Take 100 mg by mouth daily   Refills:  0       CRESTOR PO        Dose:  40 mg   Take 40 mg by mouth daily   Refills:  0       CYTOMEL 5 MCG tablet   Generic drug:  liothyronine        Dose:  5 mcg   Take 5 mcg by mouth daily   Refills:  0       isosorbide mononitrate 60 MG 24 hr tablet   Commonly known as:  IMDUR   Used for:  CAD (coronary artery  disease)        Dose:  60 mg   Take 1 tablet (60 mg) by mouth daily   Quantity:  90 tablet   Refills:  0       MAGNESIUM OXIDE PO        Dose:  500 mg   Take 500 mg by mouth every evening   Refills:  0       metoprolol tartrate 25 MG tablet   Commonly known as:  LOPRESSOR   Used for:  Coronary artery disease involving native coronary artery of native heart with angina pectoris (H)        Dose:  25 mg   Take 1 tablet (25 mg) by mouth 2 times daily   Quantity:  180 tablet   Refills:  2       nitroGLYcerin 0.4 MG sublingual tablet   Commonly known as:  NITROSTAT   Used for:  Unstable angina (H), Coronary artery disease involving native coronary artery of native heart without angina pectoris        Dose:  0.4 mg   Place 1 tablet (0.4 mg) under the tongue every 5 minutes as needed   Quantity:  25 tablet   Refills:  3       NONFORMULARY        Dose:  1 tablet   Take 1 tablet by mouth every morning Vitamin K complex 150mcg OTC   Refills:  0       OMEGA-3 FISH OIL PO        Dose:  1.2 g   Take 1.2 g by mouth 2 times daily   Refills:  0       SYNTHROID PO        Dose:  88 mcg   Take 88 mcg by mouth daily   Refills:  0       VITAMIN D (CHOLECALCIFEROL) PO        Dose:  5000 Units   Take 5,000 Units by mouth every evening   Refills:  0       ZETIA PO        Dose:  10 mg   Take 10 mg by mouth every morning   Refills:  0                Protect others around you: Learn how to safely use, store and throw away your medicines at www.disposemymeds.org.             Medication List: This is a list of all your medications and when to take them. Check marks below indicate your daily home schedule. Keep this list as a reference.      Medications           Morning Afternoon Evening Bedtime As Needed    amLODIPine 5 MG tablet   Commonly known as:  NORVASC   Take 1 tablet (5 mg) by mouth 2 times daily   Last time this was given:  5 mg on 4/17/2018  8:20 AM                                arginine 500 MG tablet   Take 1 tablet (500 mg) by  mouth 2 times daily                                aspirin 81 MG EC tablet   Take 162 mg by mouth At Bedtime                                CALCIUM-VITAMIN D PO   Take 1 tablet by mouth every evening (strength unknown, does note that calcium is 800 mg)                                COENZYME Q-10 PO   Take 100 mg by mouth daily                                CRESTOR PO   Take 40 mg by mouth daily   Last time this was given:  40 mg on 4/17/2018  8:21 AM                                CYTOMEL 5 MCG tablet   Take 5 mcg by mouth daily   Last time this was given:  5 mcg on 4/17/2018  4:29 AM   Generic drug:  liothyronine                                isosorbide mononitrate 60 MG 24 hr tablet   Commonly known as:  IMDUR   Take 1 tablet (60 mg) by mouth daily   Last time this was given:  60 mg on 4/17/2018  8:19 AM                                MAGNESIUM OXIDE PO   Take 500 mg by mouth every evening                                metoprolol tartrate 25 MG tablet   Commonly known as:  LOPRESSOR   Take 1 tablet (25 mg) by mouth 2 times daily   Last time this was given:  25 mg on 4/17/2018  8:18 AM                                nitroGLYcerin 0.4 MG sublingual tablet   Commonly known as:  NITROSTAT   Place 1 tablet (0.4 mg) under the tongue every 5 minutes as needed                                NONFORMULARY   Take 1 tablet by mouth every morning Vitamin K complex 150mcg OTC                                OMEGA-3 FISH OIL PO   Take 1.2 g by mouth 2 times daily                                SYNTHROID PO   Take 88 mcg by mouth daily   Last time this was given:  88 mcg on 4/17/2018  4:29 AM                                VITAMIN D (CHOLECALCIFEROL) PO   Take 5,000 Units by mouth every evening                                ZETIA PO   Take 10 mg by mouth every morning

## 2018-04-16 NOTE — H&P
[facility] Outpatient / Observation Unit  History and Physical Exam     Marli Nguyen MRN# [id number]   YOB: 1960 Age: 58 year old      Date of Admission:  4/16/2018    Primary care provider: Aixa Brantley          Assessment:   Marli Nguyen is a 58 year old female with a PMH significant for CAD, chronic angina due to microvascular disease, DM, HTN, HLP, hx of Grave's disease s/p radioiodide therapy, and hx of gallstone pancreatitis, who presents with chest pain.   Work up in ED reveals: VSS. BMP and CBC are unremarkable. Troponin <0.015. EKG sinus ceasar. CXR clear. She received 324 mg PO ASA and IV morphine with improvement of pain. Pt also took 3 nitro prior to arrival.   Patient is being registered to observation for further evaluation and to rule out possible ACS.     1. Acute Chest pain: suspect cardiac source, stable chronic angina. Hx of 2v CABG in 2006, LIMA graft to LAD and vein graft to OM2, subsequently underwent stenting to left main in 2013, known microvascular disease. Underwent cath last year due to sx concerning for unstable angina, no new flow limiting lesions, no interventions. Per chart review, has had increase in chest pains due to anxiety or stress. Resume home ASA, BB, Imdur, and statin. ED work up is reassuring. ED spoke with cardiology who recommends stress test. Will monitor on tele, trend troponins and obtain an exercise stress test in the AM.   2. DM - diet controlled  3. HTN - resume home amlodipine and metoprolol.   4. HLP - resume statin  5. Hx of Grave's disease - s/p radioiodide therapy, now on levothyroxine, resume           Plan:     1. [place] to Observation  2. Continue telemetry  3. Follow serial troponins  4. Stress testing with Stress Echo  5. Cont Aspirin EC 81 mg po daily  6. Blood pressure control  7. Morphine and nitroglycerine PRN for pain    8. regular diet, No caffeine if Nuclear testing selected  9. DVT prophylaxis: pt at low risk, encourage  ambulation  10. Code Status: full code  11. Dispo: discharge home tomorrow likely                  Chief Complaint:   Chest Pain         History of Present Illness:   Marli Nguyen is a 58 year old female with a PMH significant for CAD, chronic angina due to microvascular disease, DM, HTN, HLP, hx of Grave's disease s/p radioiodide therapy, and hx of gallstone pancreatitis, who presents with chest pain. The patient has a history of chronic stable angina but states that she has been doing relatively well over the past year.  She notes she developed an episode of chest pain last Thursday that was fairly intense, prompting her to take a nitro.  The nitro did relieve her symptoms.  She then notes on Saturday that she was running up and down the stairs at home doing laundry, which she normally tolerates very well, and noted to be more dyspneic than normal.  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  Then again today while sitting at work, she noted onset of midsternal chest pain with radiation to her left arm and up her neck.  This was around 12:00 today.  This did occur at rest.  She does endorse some mild nausea with this episode.  She also notes she felt a bit anxious which is similar to her previous heart attack.  She took 2 nitro while at work, and a third while in the waiting room at the ED with improvement of her pain, but not complete resolution. She denies fever, chills, cough, nausea, vomiting, abdominal pain, diarrhea, or dysuria.  She denies any recent changes to her medications.  She does note that she will occasionally miss a day of her meds, maybe once every week or 2.             Past Medical History:     Past Medical History:   Diagnosis Date     Angina pectoris (H) 6/24/2013     Cardiac microvascular disease (H)      Chronic left hip pain      Coronary artery disease     s/p CABG 2006 (LIMA to LAD and saphneous vein graft to OM1 and OM3), and KERRI  to left main in 2013     DM type 2 (diabetes mellitus, type 2) (H)      Gallstone pancreatitis 2012     Graves disease     s/p radioiodide therapy in 2001, now takes synthroid     Hyperlipidemia      Hypertension      SIRS (systemic inflammatory response syndrome) (H) 9/8/2012     Unstable angina (H) 10/12/2016               Past Surgical History:     Past Surgical History:   Procedure Laterality Date     CARPAL TUNNEL RELEASE RT/LT       CHOLECYSTECTOMY  4/12     CORONARY ANGIOGRAPHY ADULT ORDER  11/2006    70% ostial left main lesion, 50-60% mid LAD stenosis prox. to LIMA insertion site, circumflex with widely patent saph vein graft into first obtuse marginal, tiny posterolateral branch occluded     CORONARY ANGIOGRAPHY ADULT ORDER  12/2006    attempted circ OM3 PTCA     CORONARY ANGIOGRAPHY ADULT ORDER  6/2013    KERRI to ostial left main, widely patent LIMA to LAD and saphenous vein graft     CORONARY ANGIOGRAPHY ADULT ORDER  1/2014    left main stent widely patent, LIMA to LAD and vein graft to first obtuse marginal patent     CORONARY ANGIOGRAPHY ADULT ORDER  10/13/16    Left main:there is evidence of previous stent implantation in the left main with no evidence of restenosis. The distal and mid LAD are free of signigficant narrowing. The bypass graft to the marginal branch is widely patent. The study is unchanged since her last study on 11/14/2014.     CORONARY ARTERY BYPASS  3/2006    LIMA to LAD, sequential SV grafts to OM1 and OM3     DILATION AND CURETTAGE, HYSTEROSCOPY, ABLATE ENDOMETRIUM NOVASURE, COMBINED  6/28/2012    Procedure: COMBINED DILATION AND CURETTAGE, HYSTEROSCOPY, ABLATE ENDOMETRIUM NOVASURE;  DILATION AND CURETTAGE, HYSTEROSCOPY, ABLATE ENDOMETRIUM,pelvic exam under anesthesia;  Surgeon: Johnna Harley MD;  Location: [facility] OR     LAPAROSCOPIC HYSTERECTOMY TOTAL, BILATERAL SALPINGO-OOPHORECTOMY, COMBINED  9/20/2012    Procedure: COMBINED LAPAROSCOPIC HYSTERECTOMY TOTAL, SALPINGO-OOPHORECTOMY;   LAPAROSCOPIC HYSTERECTOMY TOTAL, SALPINGO-OOPHORECTOMY;  Surgeon: Johnna Harley MD;  Location: [facility] OR     LASIK BILATERAL       REPAIR VAGINAL CUFF  10/31/2012    Procedure: REPAIR VAGINAL CUFF;  Irrigation and repair of Vaginal Cuff;  Surgeon: Johnna Harley MD;  Location: [facility] OR     Right thumb mass excision  2008     [place] teeth removal                 Social History:     Social History     Social History     Marital status:      Spouse name: N/A     Number of children: N/A     Years of education: N/A     Occupational History     Not on file.     Social History Main Topics     Smoking status: Never Smoker     Smokeless tobacco: Never Used     Alcohol use 0.0 oz/week     0 Standard drinks or equivalent per week      Comment: rare     Drug use: No     Sexual activity: Not on file     Other Topics Concern     Caffeine Concern Yes     10 oz of coffee per day     Sleep Concern No     Weight Concern Yes     weight gain     Special Diet Yes     gluten free     Exercise Yes     walking 2 miles daily     Social History Narrative               Family History:     Family History   Problem Relation Age of Onset     Hyperlipidemia Mother      Unknown/Adopted Father      DIABETES Paternal Grandmother               Allergies:      Allergies   Allergen Reactions     Bactrim [Sulfamethoxazole W/Trimethoprim]      rash     Erythromycin Cramps     Sulfa Drugs      Tape [Adhesive Tape]                Medications:     Prior to Admission medications    Medication Sig Last Dose Taking? Auth Provider   isosorbide mononitrate (IMDUR) 60 MG 24 hr tablet Take 1 tablet (60 mg) by mouth daily   Basia Dawkins APRN CNP   amLODIPine (NORVASC) 5 MG tablet Take 1 tablet (5 mg) by mouth 2 times daily   Joana Sheppard DO   metoprolol tartrate (LOPRESSOR) 25 MG tablet Take 1 tablet (25 mg) by mouth 2 times daily   Joana Sheppard DO   arginine 500 MG tablet Take 1 tablet (500 mg) by mouth 2  times daily   Joana Sheppard, DO   Rosuvastatin Calcium (CRESTOR PO) Take 40 mg by mouth daily   Reported, Patient   liothyronine (CYTOMEL) 5 MCG tablet Take 5 mcg by mouth daily   Reported, Patient   nitroglycerin (NITROSTAT) 0.4 MG sublingual tablet Place 1 tablet (0.4 mg) under the tongue every 5 minutes as needed   Joana Sheppard,    CALCIUM-VITAMIN D PO Take 1 tablet by mouth every evening (strength unknown, does note that calcium is 800 mg)   Unknown, Entered By History   VITAMIN D, CHOLECALCIFEROL, PO Take 1,000 Units by mouth every evening   Unknown, Entered By History   MAGNESIUM OXIDE PO Take 200 mg by mouth every evening   Unknown, Entered By History   COENZYME Q-10 PO Take 100 mg by mouth daily   Reported, Patient   Omega-3 Fatty Acids (OMEGA-3 FISH OIL PO) Take 2 g by mouth 2 times daily    Reported, Patient   aspirin 81 MG EC tablet Take 162 mg by mouth At Bedtime    Reported, Patient   Levothyroxine Sodium (SYNTHROID PO) Take 88 mcg by mouth daily    Reported, Patient              Review of Systems:   A Comprehensive greater than 10 system review of systems was carried out.  Pertinent positives and negatives are noted above.  Otherwise negative for contributory information.     Constitutional, neuro, ENT, endocrine, pulmonary, cardiac, gastrointestinal, genitourinary, musculoskeletal, integument and psychiatric systems are negative, except as otherwise noted.           Physical Exam:   Blood pressure 125/71, pulse 61, temperature 97.5  F (36.4  C), temperature source Temporal, resp. rate 22, weight 93 kg (205 lb), SpO2 94 %.    GENERAL:  Comfortable.  PSYCH: pleasant, oriented, No acute distress.  HEENT:  Atraumatic, normocephalic. Normal conjunctiva, normal hearing, and oropharynx is normal.  NECK:  Supple, no neck vein distention, adenopathy or bruits, normal thyroid.  HEART:  Normal S1, S2 with no murmur, no pericardial rub, gallops or S3 or S4.  LUNGS:  Clear to  auscultation, normal Respiratory effort. No wheezing, rales or ronchi.  GI:  Soft, no hepatosplenomegaly, normal bowel sounds. Non-tender, non distended.   EXTREMITIES:  No pedal edema, +2 pulses bilateral and equal.  SKIN:  Dry to touch, No rash, wound or ulcerations.  NEUROLOGIC:  CN 2-12 intact, BL 5/5 symmetric upper and lower extremity strength, sensation is intact with no focal deficits.              Data:     EKG demonstrates:  sinus bradycardia, unchanged from previous tracings.      Recent Labs  Lab 04/16/18  1443   WBC 8.0   HGB 15.5   HCT 46.0   MCV 91          Recent Labs  Lab 04/16/18  1443      POTASSIUM 4.3   CHLORIDE 103   CO2 27   ANIONGAP 8   *   BUN 14   CR 0.82   GFRESTIMATED 72   GFRESTBLACK 87   ANNAMARIE 9.9       Recent Labs  Lab 04/16/18  1449 04/16/18  1443   TROPONIN 0.00  --    TROPI  --  <0.015       Recent Results (from the past 48 hour(s))   XR Chest 2 Views    Narrative    XR CHEST 2 VW 4/16/2018 3:17 PM    HISTORY: Chest pain.    COMPARISON: 10/12/2016    FINDINGS: No airspace consolidation, pleural effusion or pneumothorax.  Stable heart size. Sternal sutures and mediastinal clips are  unchanged.      Impression    IMPRESSION: No acute cardiopulmonary abnormality.    MD Meka SALES PA-C

## 2018-04-16 NOTE — ED NOTES
[facility]  ED Nurse Handoff Report    Marli Nguyen is a 58 year old female   ED Chief complaint: Chest Pain  . ED Diagnosis:   Final diagnoses:   Acute chest pain     Allergies:   Allergies   Allergen Reactions     Bactrim [Sulfamethoxazole W/Trimethoprim]      rash     Erythromycin Cramps     Sulfa Drugs      Tape [Adhesive Tape]        Code Status: Full Code  Activity level - Baseline/Home:  Independent. Activity Level - Current:   Independent. Lift room needed: No. Bariatric: No   Needed: No   Isolation: No. Infection: Not Applicable.     Vital Signs:   Vitals:    04/16/18 1530 04/16/18 1545 04/16/18 1600 04/16/18 1615   BP: 115/62 124/65 118/61 111/61   Pulse:       Resp: 14 11 14 12   Temp:       TempSrc:       SpO2: 95% 93% 92% 95%   Weight:           Cardiac Rhythm:  ,   Cardiac  Cardiac Rhythm: Normal sinus rhythm  Pain level: 0-10 Pain Scale: 2  Patient confused: No. Patient Falls Risk: Yes.   Elimination Status: Has voided   Patient Report - Initial Complaint: CHEST PAIN . Focused Assessment: Cardiac - Cardiac WDL:  WDL except Capillary Refill, General: unable to assess Cardiac Comment: Pt presents wtih having chest pai8n and shas significant cardiac hx.   Review of Systems (Cardiac) - Cardiac Signs/Symptoms: chest pain Cardiac Conditions: congenital heart defect  Chest Pain Assessment - Rating (0-10): 5 Duration: 6 hours  Precipitating Factors: at rest Chest Pain Reproducible?: No  Cardiac Monitoring - EKG Monitoring: Yes Cardiac Regularity: Regular Cardiac Rhythm: NSR  Chest Pain Assessment - Chest Pain Location: arm, left   Tests Performed:   Labs Ordered and Resulted from Time of ED Arrival Up to the Time of Departure from the ED   BASIC METABOLIC PANEL - Abnormal; Notable for the following:        Result Value    Glucose 111 (*)     All other components within normal limits   CBC WITH PLATELETS DIFFERENTIAL   TROPONIN I   ISTAT TROPONIN NURSING POCT   TROPONIN POCT      XR Chest 2 Views   Final Result   IMPRESSION: No acute cardiopulmonary abnormality.      TANGELA HERNANDEZ MD        . Abnormal Results: .   Treatments provided:  Medications   morphine (PF) injection 4 mg (4 mg Intravenous Given 4/16/18 1528)   ondansetron (ZOFRAN) injection 4 mg (4 mg Intravenous Given 4/16/18 1451)   aspirin chewable tablet 324 mg (324 mg Oral Given 4/16/18 1440)        Family Comments:  at bedside   OBS brochure/video discussed/provided to patient:yes  ED Medications:   Medications   morphine (PF) injection 4 mg (4 mg Intravenous Given 4/16/18 1528)   ondansetron (ZOFRAN) injection 4 mg (4 mg Intravenous Given 4/16/18 1451)   aspirin chewable tablet 324 mg (324 mg Oral Given 4/16/18 1440)     Drips infusing:  No  For the majority of the shift, the patient's behavior Green. Interventions performed were monitor  .     Severe Sepsis OR Septic Shock Diagnosis Present: No      ED Nurse Name/Phone Number: Raymond Burnett,   4:28 PM    RECEIVING UNIT ED HANDOFF REVIEW    Above ED Nurse Handoff Report was reviewed: Yes  Reviewed by: Jane Corbett on April 16, 2018 at 5:29 PM

## 2018-04-17 ENCOUNTER — APPOINTMENT (OUTPATIENT)
Dept: CARDIOLOGY | Facility: CLINIC | Age: 58
End: 2018-04-17
Attending: PHYSICIAN ASSISTANT
Payer: COMMERCIAL

## 2018-04-17 VITALS
HEART RATE: 61 BPM | DIASTOLIC BLOOD PRESSURE: 57 MMHG | TEMPERATURE: 97.3 F | OXYGEN SATURATION: 91 % | RESPIRATION RATE: 16 BRPM | WEIGHT: 205 LBS | HEIGHT: 63 IN | SYSTOLIC BLOOD PRESSURE: 110 MMHG | BODY MASS INDEX: 36.32 KG/M2

## 2018-04-17 PROCEDURE — 93350 STRESS TTE ONLY: CPT | Mod: 26 | Performed by: INTERNAL MEDICINE

## 2018-04-17 PROCEDURE — 99217 ZZC OBSERVATION CARE DISCHARGE: CPT | Performed by: PHYSICIAN ASSISTANT

## 2018-04-17 PROCEDURE — 93325 DOPPLER ECHO COLOR FLOW MAPG: CPT | Mod: TC

## 2018-04-17 PROCEDURE — 93018 CV STRESS TEST I&R ONLY: CPT | Performed by: INTERNAL MEDICINE

## 2018-04-17 PROCEDURE — 93016 CV STRESS TEST SUPVJ ONLY: CPT | Performed by: INTERNAL MEDICINE

## 2018-04-17 PROCEDURE — G0378 HOSPITAL OBSERVATION PER HR: HCPCS

## 2018-04-17 PROCEDURE — 93321 DOPPLER ECHO F-UP/LMTD STD: CPT | Mod: 26 | Performed by: INTERNAL MEDICINE

## 2018-04-17 PROCEDURE — 25500064 ZZH RX 255 OP 636: Performed by: HOSPITALIST

## 2018-04-17 PROCEDURE — 93325 DOPPLER ECHO COLOR FLOW MAPG: CPT | Mod: 26 | Performed by: INTERNAL MEDICINE

## 2018-04-17 PROCEDURE — 25000132 ZZH RX MED GY IP 250 OP 250 PS 637: Performed by: PHYSICIAN ASSISTANT

## 2018-04-17 RX ADMIN — AMLODIPINE BESYLATE 5 MG: 5 TABLET ORAL at 08:20

## 2018-04-17 RX ADMIN — ISOSORBIDE MONONITRATE 60 MG: 60 TABLET, EXTENDED RELEASE ORAL at 08:19

## 2018-04-17 RX ADMIN — ROSUVASTATIN CALCIUM 40 MG: 20 TABLET, FILM COATED ORAL at 08:21

## 2018-04-17 RX ADMIN — LEVOTHYROXINE SODIUM 88 MCG: 88 TABLET ORAL at 04:29

## 2018-04-17 RX ADMIN — HUMAN ALBUMIN MICROSPHERES AND PERFLUTREN 3 ML: 10; .22 INJECTION, SOLUTION INTRAVENOUS at 07:16

## 2018-04-17 RX ADMIN — METOPROLOL TARTRATE 25 MG: 25 TABLET ORAL at 08:18

## 2018-04-17 RX ADMIN — LIOTHYRONINE SODIUM 5 MCG: 5 TABLET ORAL at 04:29

## 2018-04-17 NOTE — DISCHARGE SUMMARY
[facility] Outpatient / Observation Unit  Discharge Summary        Marli Nguyen MRN# [id number]   YOB: 1960 Age: 58 year old     Date of Admission:  4/16/2018  Date of Discharge:  4/17/2018  Admitting Physician:  Willis Coelho MD  Discharge Physician: Meka Manjarrez PA-C  Discharging Service: Hospitalist      Primary Provider: Aixa Brantley  Primary Care Physician Phone Number: [phone]         Primary Discharge Diagnoses:    Marli Nguyen was admitted on 4/16/2018 for concerns of acute chest pain.     1. Chest pain: Ruled out ACS. Serial troponins negative and stress echo negative for ischemia.   -Suspect cardiac source, stable. Known hx of microvascular disease. Follow up with cardiology at previously scheduled time in May.          Secondary Discharge Diagnoses:     Past Medical History:   Diagnosis Date     Angina pectoris (H) 6/24/2013     Cardiac microvascular disease (H)      Chronic left hip pain      Coronary artery disease     s/p CABG 2006 (LIMA to LAD and saphneous vein graft to OM1 and OM3), and KERRI to left main in 2013     DM type 2 (diabetes mellitus, type 2) (H)      Gallstone pancreatitis 2012     Graves disease     s/p radioiodide therapy in 2001, now takes synthroid     Hyperlipidemia      Hypertension      SIRS (systemic inflammatory response syndrome) (H) 9/8/2012     Unstable angina (H) 10/12/2016                Code Status:      Full Code        Brief Hospital Summary:        Reason for your hospital stay       Chest pain. Serial troponins were negative and stress echocardiogram was   negative.                    Please refer to initial admission history and physical for further details.   Briefly, Marli Nguyen was admitted on 4/16/2018 for concerns of acute chest pain.   Initial work up in the ED did not reveal evidence of STEMI or findings consistent with unstable angina or acute coronary ischemia. Pt was registered to the Observation Unit for further  evaluation.   Pt ruled out with serial troponins, underwent Stress Echo that did not show evidence of significant coronary ischemia. Labs were reviewed and significant results addressed. On the day of discharge, pt was pain free, with no complaints of pain. Medications were reviewed and adjustments made as necessary. Pt is instructed to follow up as below.           Significant Lab During Hospitalization:        Recent Labs  Lab 04/16/18  1443   WBC 8.0   HGB 15.5   HCT 46.0   MCV 91          Recent Labs  Lab 04/16/18  1443      POTASSIUM 4.3   CHLORIDE 103   CO2 27   ANIONGAP 8   *   BUN 14   CR 0.82   GFRESTIMATED 72   GFRESTBLACK 87   ANNAMARIE 9.9       Recent Labs  Lab 04/16/18  2125 04/16/18  1932 04/16/18  1449 04/16/18  1443   TROPONIN  --   --  0.00  --    TROPI <0.015 <0.015  --  <0.015                Significant Imaging During Hospitalization:      Recent Results (from the past 48 hour(s))   XR Chest 2 Views    Narrative    XR CHEST 2 VW 4/16/2018 3:17 PM    HISTORY: Chest pain.    COMPARISON: 10/12/2016    FINDINGS: No airspace consolidation, pleural effusion or pneumothorax.  Stable heart size. Sternal sutures and mediastinal clips are  unchanged.      Impression    IMPRESSION: No acute cardiopulmonary abnormality.    TANGELA HERNANDEZ MD              Pending Results:        Unresulted Labs Ordered in the Past 30 Days of this Admission     No orders found for last 61 day(s).              Consultations This Hospital Stay:      No consultations were requested during this admission         Discharge Instructions and Follow-Up:      Follow-up Appointments     Follow-up and recommended labs and tests        Follow up with primary care provider, Aixa Brantley MD, within 7 days   for hospital follow- up.  No follow up labs or test are needed.  Follow up with cardiology as previously scheduled.                  Pt instructed to follow up with PCP in 7 days.   Follow-up Labs None        Discharge  Disposition:      Discharged to home         Discharge Medications:        Current Discharge Medication List      CONTINUE these medications which have NOT CHANGED    Details   Ezetimibe (ZETIA PO) Take 10 mg by mouth every morning      NONFORMULARY Take 1 tablet by mouth every morning Vitamin K complex 150mcg OTC      isosorbide mononitrate (IMDUR) 60 MG 24 hr tablet Take 1 tablet (60 mg) by mouth daily  Qty: 90 tablet, Refills: 0    Associated Diagnoses: CAD (coronary artery disease)      amLODIPine (NORVASC) 5 MG tablet Take 1 tablet (5 mg) by mouth 2 times daily  Qty: 180 tablet, Refills: 2    Associated Diagnoses: Coronary artery disease involving native coronary artery of native heart without angina pectoris      metoprolol tartrate (LOPRESSOR) 25 MG tablet Take 1 tablet (25 mg) by mouth 2 times daily  Qty: 180 tablet, Refills: 2    Associated Diagnoses: Coronary artery disease involving native coronary artery of native heart with angina pectoris (H)      arginine 500 MG tablet Take 1 tablet (500 mg) by mouth 2 times daily  Qty: 180 tablet, Refills: 3    Associated Diagnoses: CAD (coronary artery disease)      Rosuvastatin Calcium (CRESTOR PO) Take 40 mg by mouth daily      liothyronine (CYTOMEL) 5 MCG tablet Take 5 mcg by mouth daily      nitroglycerin (NITROSTAT) 0.4 MG sublingual tablet Place 1 tablet (0.4 mg) under the tongue every 5 minutes as needed  Qty: 25 tablet, Refills: 3    Associated Diagnoses: Unstable angina (H); Coronary artery disease involving native coronary artery of native heart without angina pectoris      CALCIUM-VITAMIN D PO Take 1 tablet by mouth every evening (strength unknown, does note that calcium is 800 mg)      VITAMIN D, CHOLECALCIFEROL, PO Take 5,000 Units by mouth every evening       MAGNESIUM OXIDE PO Take 500 mg by mouth every evening       COENZYME Q-10 PO Take 100 mg by mouth daily      Omega-3 Fatty Acids (OMEGA-3 FISH OIL PO) Take 1.2 g by mouth 2 times daily      "  aspirin 81 MG EC tablet Take 162 mg by mouth At Bedtime       Levothyroxine Sodium (SYNTHROID PO) Take 88 mcg by mouth daily                  Allergies:         Allergies   Allergen Reactions     Bactrim [Sulfamethoxazole W/Trimethoprim]      rash     Erythromycin Cramps     Sulfa Drugs      Tape [Adhesive Tape]            Condition and Physical on Discharge:      Discharge condition: Stable   Vitals: Blood pressure 110/57, pulse 61, temperature 97.3  F (36.3  C), temperature source Oral, resp. rate 16, height 1.6 m (5' 3\"), weight 93 kg (205 lb), SpO2 91 %.  205 lbs 0 oz      GENERAL:  Comfortable.  PSYCH: pleasant, oriented, No acute distress.  HEART: RRR  LUNGS:  Normal Respiratory effort.  EXTREMITIES:  Able to ambulate independently.  SKIN:  Dry to touch, No rash, wound or ulcerations.  NEUROLOGIC:  Grossly intact     Meka Manjarrez PA-C  "

## 2018-04-17 NOTE — PLAN OF CARE
Problem: Patient Care Overview  Goal: Plan of Care/Patient Progress Review  PRIMARY DIAGNOSIS: CHEST PAIN  OUTPATIENT/OBSERVATION GOALS TO BE MET BEFORE DISCHARGE:  1. Ruled out acute coronary syndrome (negative or stable Troponin):  Yes - negative in ED. Awaiting next level to be     drawn.   2. Pain Status: Reporting 2/10 mid-chest pain radiating to L arm. Denied need for intervention. Reports improved     since administration of Morphine in ED.   3. Appropriate provocative testing performed: No  - Stress Test Procedure: Echo - to be performed in AM.  - Interpretation of cardiac rhythm per telemetry tech: Sinus bradycardia, rate in 50's    4. Cleared by Consultants (if applicable): N/A  5. Return to near baseline physical activity: Yes  Discharge Planner Nurse   Safe discharge environment identified: Yes  Barriers to discharge: No       Entered by: Jane Corbett 04/16/2018 7:01 PM     Please review provider order for any additional goals.   Nurse to notify provider when observation goals have been met and patient is ready for discharge.

## 2018-04-17 NOTE — PROGRESS NOTES
"Chest pressure episode:    D: Reporting 5/10 chest pressure. \"Uncomfortable.\" Associated anxiety/slight shortness of breath.  A:  BP: 116/57 Heart Rate: 55 Resp: 18 SpO2: 92 %        IV Morphine given. Scheduled troponin being drawn now. STAT ECG ordered.   R: Chest pressure improved to 4/10, reports still feeling uncomfortable. Sinus bradycardia, rate in 50's per tele tech. Showed ECG to RINA Cardoso. Troponin negative. Continue with current plan of care.     "

## 2018-04-17 NOTE — PLAN OF CARE
Problem: Patient Care Overview  Goal: Plan of Care/Patient Progress Review  PRIMARY DIAGNOSIS: CHEST PAIN  OUTPATIENT/OBSERVATION GOALS TO BE MET BEFORE DISCHARGE:  1. Ruled out acute coronary syndrome (negative or stable Troponin): Yes  2. Pain Status: Pain free.  3. Appropriate provocative testing performed: Yes  - Stress Test Procedure: Regular  - Interpretation of cardiac rhythm per telemetry tech: Sinus Rythem    4. Cleared by Consultants (if applicable):N/A  5. Return to near baseline physical activity: Yes  VSS Patient denies chest pain, or chest pressure.  Stress Test completed, results pending.  Will continue to monitor  Discharge Planner Nurse   Safe discharge environment identified: Yes  Barriers to discharge: No       Entered by: Karen M. Lesch 04/17/2018 9:26 AM     Please review provider order for any additional goals.   Nurse to notify provider when observation goals have been met and patient is ready for discharge.

## 2018-04-17 NOTE — PLAN OF CARE
Problem: Patient Care Overview  Goal: Plan of Care/Patient Progress Review  Outcome: Adequate for Discharge Date Met: 04/17/18  Patient's After Visit Summary was reviewed with patient   Patient verbalized understanding of After Visit Summary, recommended follow up and was given an opportunity to ask questions.   Discharge medications sent home with patient/family: NGOC  Discharged with spouse      OBSERVATION patient END time: 09:20 AM

## 2018-04-17 NOTE — PLAN OF CARE
Problem: Patient Care Overview  Goal: Plan of Care/Patient Progress Review  PRIMARY DIAGNOSIS: CHEST PAIN  OUTPATIENT/OBSERVATION GOALS TO BE MET BEFORE DISCHARGE:  1. Ruled out acute coronary syndrome (negative or stable Troponin):  Yes, trop negativex3   2. Pain Status: Pt does report 1/10 pain in chest. Reported as chest tightness. No radiating.   3. Appropriate provocative testing performed: Yes  - Stress Test Procedure: Exercise stress test in AM  - Interpretation of cardiac rhythm per telemetry tech: SR/SB 50-60    4. Cleared by Consultants (if applicable):N/A  5. Return to near baseline physical activity: Yes  Pt is A&Ox4. VSS. Pt does have baseline bradycardia noted. Pt is clear liquid diet and NPO 3 hours prior to stress test. No caffeine diet. Hold beta blockers prior to stress test. Pt is independent in room. Pt does have 1/10 chest pain reported as pressure. This does not radiate and is mid sternal pain. Pt declines intervention at this time. Will continue to monitor.   Discharge Planner Nurse   Safe discharge environment identified: Yes  Barriers to discharge: Yes, exercise stress test in AM       Entered by: Meredith Parks 04/17/2018 1:18 AM     Please review provider order for any additional goals.   Nurse to notify provider when observation goals have been met and patient is ready for discharge.

## 2018-04-17 NOTE — PLAN OF CARE
Problem: Patient Care Overview  Goal: Plan of Care/Patient Progress Review  Problem: Patient Care Overview  Goal: Plan of Care/Patient Progress Review  PRIMARY DIAGNOSIS: CHEST PAIN  OUTPATIENT/OBSERVATION GOALS TO BE MET BEFORE DISCHARGE:  1. Ruled out acute coronary syndrome (negative or stable Troponin):  Yes, trop negativex3   2. Pain Status: Pt does report 1/10 pain in chest. Reported as chest discomfort. No radiating.   3. Appropriate provocative testing performed: Yes  - Stress Test Procedure: Exercise stress test in AM  - Interpretation of cardiac rhythm per telemetry tech: SR/SB 50-60     4. Cleared by Consultants (if applicable):N/A  5. Return to near baseline physical activity: Yes  Pt is A&Ox4. VSS. Pt does have baseline bradycardia noted. Pt is clear liquid diet and NPO 3 hours prior to stress test. No caffeine diet. Hold beta blockers prior to stress test. Pt is independent in room. Pt does have 1/10 chest pain reported as discomfort. This does not radiate and is mid sternal pain. Pt declines intervention at this time. Will continue to monitor.   Discharge Planner Nurse   Safe discharge environment identified: Yes  Barriers to discharge: Yes, exercise stress test in AM       Entered by: Meredith Parks 04/17/2018 4:00 AM  Please review provider order for any additional goals.   Nurse to notify provider when observation goals have been met and patient is ready for discharge.

## 2018-04-19 LAB — INTERPRETATION ECG - MUSE: NORMAL

## 2018-05-02 ENCOUNTER — TELEPHONE (OUTPATIENT)
Dept: CARDIOLOGY | Facility: CLINIC | Age: 58
End: 2018-05-02

## 2018-05-02 DIAGNOSIS — R06.02 SHORTNESS OF BREATH: Primary | ICD-10-CM

## 2018-05-02 NOTE — TELEPHONE ENCOUNTER
"Received a VM from pt stating she has had some symptoms while sleeping that are concerning to her, requested a call back to discuss. Called back and spoke with pt. Pt reports that during the night she felt like she stopped breathing several times. Pt reports she has felt congested due to being sick with a cold and took Nyquil last night. Pt stated as she was falling asleep she felt like she stopped breathing, then she would wake up, and this happened several times. Pt also reports feeling \"pressure in her chest\" when this was happening. Pt stated she did not take any nitroglycerin or check her BP at that time, states the feeling was not similar to chest pain she has had in the past. Pt was admitted for chest pain on 4/16/18 and had a negative stress echo during that admission. Pt reports that eventually she got up and sat in a recliner and was then able to fall asleep. Pt reports a similar episode happened ~1 month ago while she was on vacation and napping in a hammock. Denies alcohol use at that time. Pt denies snoring or any observed episodes of apnea, and denies daytime sleepiness or feeling of unrestful sleep. Pt reports she had a sleep study done ~10 years ago that was normal. Pt is scheduled for a BMP and follow up visit with Dr. Sheppard on 5/8/18. Advised pt she may wish to avoid taking Nyquil again and may try sleeping with her head elevated while she is still feeling congested. Will route to Dr. Sheppard for any further recommendations prior to appointment and call back to pt.   "

## 2018-05-03 NOTE — TELEPHONE ENCOUNTER
Reviewed with Dr. Sheppard, who stated to add on an NT pro-BNP to pt's lab work on 5/8/18. Called pt, communicated plan, pt verbalized understanding. Order placed.

## 2018-05-08 ENCOUNTER — OFFICE VISIT (OUTPATIENT)
Dept: CARDIOLOGY | Facility: CLINIC | Age: 58
End: 2018-05-08
Attending: NURSE PRACTITIONER
Payer: COMMERCIAL

## 2018-05-08 VITALS
DIASTOLIC BLOOD PRESSURE: 68 MMHG | BODY MASS INDEX: 35.44 KG/M2 | HEIGHT: 63 IN | HEART RATE: 60 BPM | WEIGHT: 200 LBS | SYSTOLIC BLOOD PRESSURE: 120 MMHG

## 2018-05-08 DIAGNOSIS — I25.10 CORONARY ARTERY DISEASE INVOLVING NATIVE CORONARY ARTERY OF NATIVE HEART WITHOUT ANGINA PECTORIS: ICD-10-CM

## 2018-05-08 DIAGNOSIS — K21.9 GASTROESOPHAGEAL REFLUX DISEASE WITHOUT ESOPHAGITIS: Primary | ICD-10-CM

## 2018-05-08 DIAGNOSIS — R07.89 ATYPICAL CHEST PAIN: ICD-10-CM

## 2018-05-08 DIAGNOSIS — R06.02 SHORTNESS OF BREATH: ICD-10-CM

## 2018-05-08 LAB
ANION GAP SERPL CALCULATED.3IONS-SCNC: 5 MMOL/L (ref 3–14)
BUN SERPL-MCNC: 21 MG/DL (ref 7–30)
CALCIUM SERPL-MCNC: 9.8 MG/DL (ref 8.5–10.1)
CHLORIDE SERPL-SCNC: 105 MMOL/L (ref 94–109)
CO2 SERPL-SCNC: 30 MMOL/L (ref 20–32)
CREAT SERPL-MCNC: 0.81 MG/DL (ref 0.52–1.04)
GFR SERPL CREATININE-BSD FRML MDRD: 72 ML/MIN/1.7M2
GLUCOSE SERPL-MCNC: 104 MG/DL (ref 70–99)
NT-PROBNP SERPL-MCNC: 117 PG/ML (ref 0–125)
POTASSIUM SERPL-SCNC: 4.2 MMOL/L (ref 3.4–5.3)
SODIUM SERPL-SCNC: 140 MMOL/L (ref 133–144)

## 2018-05-08 PROCEDURE — 80048 BASIC METABOLIC PNL TOTAL CA: CPT | Performed by: NURSE PRACTITIONER

## 2018-05-08 PROCEDURE — 83880 ASSAY OF NATRIURETIC PEPTIDE: CPT | Performed by: NURSE PRACTITIONER

## 2018-05-08 PROCEDURE — 99214 OFFICE O/P EST MOD 30 MIN: CPT | Performed by: INTERNAL MEDICINE

## 2018-05-08 PROCEDURE — 36415 COLL VENOUS BLD VENIPUNCTURE: CPT | Performed by: NURSE PRACTITIONER

## 2018-05-08 NOTE — PROGRESS NOTES
HPI and Plan:   See dictation    Orders Placed This Encounter   Procedures     Follow-Up with Cardiologist       Orders Placed This Encounter   Medications     ranitidine (ZANTAC) 150 MG tablet     Sig: Take 1 tablet (150 mg) by mouth 2 times daily     Dispense:  60 tablet     Refill:  1       There are no discontinued medications.      Encounter Diagnoses   Name Primary?     Coronary artery disease involving native coronary artery of native heart without angina pectoris      Gastroesophageal reflux disease without esophagitis Yes     Atypical chest pain        CURRENT MEDICATIONS:  Current Outpatient Prescriptions   Medication Sig Dispense Refill     amLODIPine (NORVASC) 5 MG tablet Take 1 tablet (5 mg) by mouth 2 times daily 180 tablet 2     arginine 500 MG tablet Take 1 tablet (500 mg) by mouth 2 times daily 180 tablet 3     aspirin 81 MG EC tablet Take 162 mg by mouth At Bedtime        CALCIUM-VITAMIN D PO Take 1 tablet by mouth every evening (strength unknown, does note that calcium is 800 mg)       COENZYME Q-10 PO Take 100 mg by mouth daily       Ezetimibe (ZETIA PO) Take 10 mg by mouth every morning       isosorbide mononitrate (IMDUR) 60 MG 24 hr tablet Take 1 tablet (60 mg) by mouth daily 90 tablet 0     Levothyroxine Sodium (SYNTHROID PO) Take 88 mcg by mouth daily        liothyronine (CYTOMEL) 5 MCG tablet Take 5 mcg by mouth daily       MAGNESIUM OXIDE PO Take 500 mg by mouth every evening        metoprolol tartrate (LOPRESSOR) 25 MG tablet Take 1 tablet (25 mg) by mouth 2 times daily 180 tablet 2     nitroglycerin (NITROSTAT) 0.4 MG sublingual tablet Place 1 tablet (0.4 mg) under the tongue every 5 minutes as needed 25 tablet 3     NONFORMULARY Take 1 tablet by mouth every morning Vitamin K complex 150mcg OTC       Omega-3 Fatty Acids (OMEGA-3 FISH OIL PO) Take 1.2 g by mouth 2 times daily        ranitidine (ZANTAC) 150 MG tablet Take 1 tablet (150 mg) by mouth 2 times daily 60 tablet 1      Rosuvastatin Calcium (CRESTOR PO) Take 40 mg by mouth daily       VITAMIN D, CHOLECALCIFEROL, PO Take 5,000 Units by mouth every evening          ALLERGIES     Allergies   Allergen Reactions     Bactrim [Sulfamethoxazole W/Trimethoprim]      rash     Erythromycin Cramps     Sulfa Drugs      Tape [Adhesive Tape]        PAST MEDICAL HISTORY:  Past Medical History:   Diagnosis Date     Angina pectoris (H) 6/24/2013     Cardiac microvascular disease (H)      Chronic left hip pain      Coronary artery disease     s/p CABG 2006 (LIMA to LAD and saphneous vein graft to OM1 and OM3), and KERRI to left main in 2013     DM type 2 (diabetes mellitus, type 2) (H)      Gallstone pancreatitis 2012     Graves disease     s/p radioiodide therapy in 2001, now takes synthroid     Hyperlipidemia      Hypertension      SIRS (systemic inflammatory response syndrome) (H) 9/8/2012     Unstable angina (H) 10/12/2016       PAST SURGICAL HISTORY:  Past Surgical History:   Procedure Laterality Date     CARPAL TUNNEL RELEASE RT/LT       CHOLECYSTECTOMY  4/12     CORONARY ANGIOGRAPHY ADULT ORDER  11/2006    70% ostial left main lesion, 50-60% mid LAD stenosis prox. to LIMA insertion site, circumflex with widely patent saph vein graft into first obtuse marginal, tiny posterolateral branch occluded     CORONARY ANGIOGRAPHY ADULT ORDER  12/2006    attempted circ OM3 PTCA     CORONARY ANGIOGRAPHY ADULT ORDER  6/2013    KERRI to ostial left main, widely patent LIMA to LAD and saphenous vein graft     CORONARY ANGIOGRAPHY ADULT ORDER  1/2014    left main stent widely patent, LIMA to LAD and vein graft to first obtuse marginal patent     CORONARY ANGIOGRAPHY ADULT ORDER  10/13/16    Left main:there is evidence of previous stent implantation in the left main with no evidence of restenosis. The distal and mid LAD are free of signigficant narrowing. The bypass graft to the marginal branch is widely patent. The study is unchanged since her last study on  11/14/2014.     CORONARY ARTERY BYPASS  3/2006    LIMA to LAD, sequential SV grafts to OM1 and OM3     DILATION AND CURETTAGE, HYSTEROSCOPY, ABLATE ENDOMETRIUM NOVASURE, COMBINED  6/28/2012    Procedure: COMBINED DILATION AND CURETTAGE, HYSTEROSCOPY, ABLATE ENDOMETRIUM NOVASURE;  DILATION AND CURETTAGE, HYSTEROSCOPY, ABLATE ENDOMETRIUM,pelvic exam under anesthesia;  Surgeon: Johnna Harley MD;  Location: [facility] OR     LAPAROSCOPIC HYSTERECTOMY TOTAL, BILATERAL SALPINGO-OOPHORECTOMY, COMBINED  9/20/2012    Procedure: COMBINED LAPAROSCOPIC HYSTERECTOMY TOTAL, SALPINGO-OOPHORECTOMY;  LAPAROSCOPIC HYSTERECTOMY TOTAL, SALPINGO-OOPHORECTOMY;  Surgeon: Johnna Harley MD;  Location: [facility] OR     LASIK BILATERAL       REPAIR VAGINAL CUFF  10/31/2012    Procedure: REPAIR VAGINAL CUFF;  Irrigation and repair of Vaginal Cuff;  Surgeon: Johnna Harley MD;  Location: [facility] OR     Right thumb mass excision  2008     [place] teeth removal         FAMILY HISTORY:  Family History   Problem Relation Age of Onset     Hyperlipidemia Mother      Unknown/Adopted Father      DIABETES Paternal Grandmother        SOCIAL HISTORY:  Social History     Social History     Marital status:      Spouse name: N/A     Number of children: N/A     Years of education: N/A     Social History Main Topics     Smoking status: Never Smoker     Smokeless tobacco: Never Used     Alcohol use 0.0 oz/week     0 Standard drinks or equivalent per week      Comment: rare     Drug use: No     Sexual activity: Not Asked     Other Topics Concern     Caffeine Concern Yes     10 oz of coffee per day     Sleep Concern No     Weight Concern Yes     weight gain     Special Diet Yes     gluten free     Exercise Yes     walking 2 miles daily     Social History Narrative       Review of Systems:  Skin:  Positive for   losing hair   Eyes:  Positive for glasses    ENT:  Negative for      Respiratory:  Positive for shortness of breath;dyspnea on exertion     Cardiovascular:    " Positive for;edema chest pressure after left arm hurts  Gastroenterology: Negative for      Genitourinary:  not assessed      Musculoskeletal:  Negative for      Neurologic:  Positive for numbness or tingling of feet    Psychiatric:  Positive for sleep disturbances    Heme/Lymph/Imm:  Positive for allergies    Endocrine:  Positive for thyroid disorder      Physical Exam:  Vitals: /68  Pulse 60  Ht 1.6 m (5' 3\")  Wt 90.7 kg (200 lb)  BMI 35.43 kg/m2    Constitutional:  cooperative, alert and oriented, well developed, well nourished, in no acute distress        Skin:  warm and dry to the touch          Head:  normocephalic        Eyes:  pupils equal and round        Lymph:      ENT:  dentition good        Neck:  JVP normal JVP elevated      Respiratory:  healed median sternotomy scar;clear to auscultation         Cardiac: regular rhythm;normal S1 and S2       systolic murmur        pulses full and equal                                        GI:  abdomen soft        Extremities and Muscular Skeletal:  no edema;no deformities, clubbing, cyanosis, erythema observed              Neurological:  no gross motor deficits        Psych:  Alert and Oriented x 3          CC  Aixa Brantley MD  [facility]  [address] [place] [address]                  "

## 2018-05-08 NOTE — MR AVS SNAPSHOT
After Visit Summary   5/8/2018    Marli Nguyen    MRN: [id number]           Patient Information     Date Of Birth          1960        Visit Information        Provider Department      5/8/2018 2:45 PM Joana Sheppard DO [facility]        Today's Diagnoses     Gastroesophageal reflux disease without esophagitis    -  1    Coronary artery disease involving native coronary artery of native heart without angina pectoris        Atypical chest pain           Follow-ups after your visit        Additional Services     Follow-Up with Cardiologist                 Future tests that were ordered for you today     Open Future Orders        Priority Expected Expires Ordered    Follow-Up with Cardiologist Routine 6/7/2018 5/8/2019 5/8/2018            Who to contact     If you have questions or need follow up information about today's clinic visit or your schedule please contact [facility] directly at [phone].  Normal or non-critical lab and imaging results will be communicated to you by MyChart, letter or phone within 4 business days after the clinic has received the results. If you do not hear from us within 7 days, please contact the clinic through [employer] or phone. If you have a critical or abnormal lab result, we will notify you by phone as soon as possible.  Submit refill requests through [facility] or call your pharmacy and they will forward the refill request to us. Please allow 3 business days for your refill to be completed.          Additional Information About Your Visit        MyChart Information     [facility] gives you secure access to your electronic health record. If you see a primary care provider, you can also send messages to your care team and make appointments. If you have questions, please call your primary care clinic.  If you do not have a primary care provider, please call  "[phone] and they will assist you.        Care EveryWhere ID     This is your Care EveryWhere ID. This could be used by other organizations to access your [place] medical records  [id number]        Your Vitals Were     Pulse Height BMI (Body Mass Index)             60 1.6 m (5' 3\") 35.43 kg/m2          Blood Pressure from Last 3 Encounters:   05/08/18 120/68   04/17/18 110/57   09/29/17 124/72    Weight from Last 3 Encounters:   05/08/18 90.7 kg (200 lb)   04/16/18 93 kg (205 lb)   09/29/17 91.2 kg (201 lb)              We Performed the Following     Follow-Up with Cardiologist          Today's Medication Changes          These changes are accurate as of 5/8/18  2:55 PM.  If you have any questions, ask your nurse or doctor.               Start taking these medicines.        Dose/Directions    ranitidine 150 MG tablet   Commonly known as:  ZANTAC   Used for:  Gastroesophageal reflux disease without esophagitis   Started by:  Joana Sheppard DO        Dose:  150 mg   Take 1 tablet (150 mg) by mouth 2 times daily   Quantity:  60 tablet   Refills:  1            Where to get your medicines      These medications were sent to [id number] - [place], [place] - [address]  [facility]  [address] [facility] [address]     Phone:  [phone]     ranitidine 150 MG tablet                Primary Care Provider Office Phone # Fax #    Aixa Brantley [phone]       [facility] [address] [address]        Equal Access to Services     [facility] AH: Hadii tevin ku hadasho Sobetty, waaxda luqadaha, qaybta kaalmada adeegyada, waxAnderson Regional Medical Centerin hayaan adeeg kharash la'aan . So [facility] [phone].    ATENCIÓN: Si habla español, tiene a griffiths disposición servicios gratuitos de asistencia lingüística. Llame al [phone].    We comply with applicable federal civil rights laws and [place] laws. We do not discriminate on the basis of race, color, national origin, " age, disability, sex, sexual orientation, or gender identity.            Thank you!     Thank you for choosing [facility]  for your care. Our goal is always to provide you with excellent care. Hearing back from our patients is one way we can continue to improve our services. Please take a few minutes to complete the written survey that you may receive in the mail after your visit with us. Thank you!             Your Updated Medication List - Protect others around you: Learn how to safely use, store and throw away your medicines at www.disposemymeds.org.          This list is accurate as of 5/8/18  2:55 PM.  Always use your most recent med list.                   Brand Name Dispense Instructions for use Diagnosis    amLODIPine 5 MG tablet    NORVASC    180 tablet    Take 1 tablet (5 mg) by mouth 2 times daily    Coronary artery disease involving native coronary artery of native heart without angina pectoris       arginine 500 MG tablet     180 tablet    Take 1 tablet (500 mg) by mouth 2 times daily    CAD (coronary artery disease)       aspirin 81 MG EC tablet      Take 162 mg by mouth At Bedtime        CALCIUM-VITAMIN D PO      Take 1 tablet by mouth every evening (strength unknown, does note that calcium is 800 mg)        COENZYME Q-10 PO      Take 100 mg by mouth daily        CRESTOR PO      Take 40 mg by mouth daily        CYTOMEL 5 MCG tablet   Generic drug:  liothyronine      Take 5 mcg by mouth daily        isosorbide mononitrate 60 MG 24 hr tablet    IMDUR    90 tablet    Take 1 tablet (60 mg) by mouth daily    CAD (coronary artery disease)       MAGNESIUM OXIDE PO      Take 500 mg by mouth every evening        metoprolol tartrate 25 MG tablet    LOPRESSOR    180 tablet    Take 1 tablet (25 mg) by mouth 2 times daily    Coronary artery disease involving native coronary artery of native heart with angina pectoris (H)       nitroGLYcerin 0.4 MG sublingual tablet     NITROSTAT    25 tablet    Place 1 tablet (0.4 mg) under the tongue every 5 minutes as needed    Unstable angina (H), Coronary artery disease involving native coronary artery of native heart without angina pectoris       NONFORMULARY      Take 1 tablet by mouth every morning Vitamin K complex 150mcg OTC        OMEGA-3 FISH OIL PO      Take 1.2 g by mouth 2 times daily        ranitidine 150 MG tablet    ZANTAC    60 tablet    Take 1 tablet (150 mg) by mouth 2 times daily    Gastroesophageal reflux disease without esophagitis       SYNTHROID PO      Take 88 mcg by mouth daily        VITAMIN D (CHOLECALCIFEROL) PO      Take 5,000 Units by mouth every evening        ZETIA PO      Take 10 mg by mouth every morning

## 2018-05-08 NOTE — LETTER
5/8/2018      Aixa Brantley MD, MD  [facility] [address]  [facility] [address]      RE: Marli Nguyen       Dear Colleague,    I had the pleasure of seeing Marli Nguyen in the [facility].    Service Date: 05/08/2018      REFERRING PHYSICIAN:  Dr. Aixa Brantley.      HISTORY OF PRESENT ILLNESS:  Ms. Nguyen is a very pleasant 58-year-old female with a history of coronary disease, previous 2-vessel CABG and preserved LV systolic function.  She also has had left main stenting in 2013.  She is here for a followup visit.  She has been seen in the emergency room in April for symptoms of chest pain.  She underwent a stress echocardiogram that was normal.  She is continuing to have intermittent symptoms of chest discomfort mainly occurring at nighttime when she is supine.  She also notes this is waking her up, and she feels like she stops breathing at times during the night.  She feels better if she sits up in a recliner, her symptoms do go away.  She has not tried sublingual nitroglycerin for this.  I did review her stress echocardiogram which was performed on 04/17.  She walked on a standard [place] protocol for 8 minutes without EKG changes or echocardiographic changes.  Her LV function augmented with exercise without regional wall motion abnormality.      PHYSICAL EXAMINATION:   VITAL SIGNS:  On exam today, her blood pressure is 120/68, pulse is 60, weight is 200 pounds, which is up about 12 pounds from last year.  Body mass index is 35.   NECK:  Carotid upstrokes seem brisk without bruit.   CARDIOVASCULAR:  Tones are regular without murmur, gallop or rub.   LUNGS:  Clear posteriorly.   EXTREMITIES:  She has no peripheral edema on exam.      ASSESSMENT AND PLAN:  In summary, Ms. Nguyen is a pleasant 58-year-old female with a history of 2-vessel CABG, preserved LV systolic function with recurrent intermittent atypical chest pains that are occurring mainly while  supine at night and some unusual breathing issue.  It does not sound like orthopnea.  She is not having difficulty breathing during the day or with exertion.  I did, however, perform an NT-proBNP upon her phone call to schedule this appointment today, and that was normal, it came back at 115.  With recent stress testing that I also reviewed this looks completely normal as well.  I have suggested that this might be related to gastroesophageal reflux disease given her description of her history and the fact that it occurs only when supine and at nighttime.  I would like to try an antiacid medication first before titrating of any of her antianginal medications and have suggested Zantac 150 mg twice daily for 2 weeks.  She is willing to give this a try, and we will have her follow up in clinic to see if this is helpful in reducing her symptoms.  It sounds like she is planning on retiring.  There is a lot of stress going on in her life with her job and some other activities that she has since retired from.  She is planning on retiring in [place] soon, which probably will help with her general health as well.  I will see her back in clinic within the next month in followup.  Please feel free to contact me with any questions you have in regards to her care.      cc:   [place] [address]         ZANE ROLON DO             D: 2018   T: 2018   MT: MARTHA      Name:     AIYANA BETH   MRN:      [id number]        Account:      [id number]   :      1960           Service Date: 2018      Document: [id number]         Outpatient Encounter Prescriptions as of 2018   Medication Sig Dispense Refill     amLODIPine (NORVASC) 5 MG tablet Take 1 tablet (5 mg) by mouth 2 times daily 180 tablet 2     arginine 500 MG tablet Take 1 tablet (500 mg) by mouth 2 times daily 180 tablet 3     aspirin 81 MG EC tablet Take 162 mg by  mouth At Bedtime        CALCIUM-VITAMIN D PO Take 1 tablet by mouth every evening (strength unknown, does note that calcium is 800 mg)       COENZYME Q-10 PO Take 100 mg by mouth daily       Ezetimibe (ZETIA PO) Take 10 mg by mouth every morning       isosorbide mononitrate (IMDUR) 60 MG 24 hr tablet Take 1 tablet (60 mg) by mouth daily 90 tablet 0     Levothyroxine Sodium (SYNTHROID PO) Take 88 mcg by mouth daily        liothyronine (CYTOMEL) 5 MCG tablet Take 5 mcg by mouth daily       MAGNESIUM OXIDE PO Take 500 mg by mouth every evening        metoprolol tartrate (LOPRESSOR) 25 MG tablet Take 1 tablet (25 mg) by mouth 2 times daily 180 tablet 2     nitroglycerin (NITROSTAT) 0.4 MG sublingual tablet Place 1 tablet (0.4 mg) under the tongue every 5 minutes as needed 25 tablet 3     NONFORMULARY Take 1 tablet by mouth every morning Vitamin K complex 150mcg OTC       Omega-3 Fatty Acids (OMEGA-3 FISH OIL PO) Take 1.2 g by mouth 2 times daily        ranitidine (ZANTAC) 150 MG tablet Take 1 tablet (150 mg) by mouth 2 times daily 60 tablet 1     Rosuvastatin Calcium (CRESTOR PO) Take 40 mg by mouth daily       VITAMIN D, CHOLECALCIFEROL, PO Take 5,000 Units by mouth every evening        No facility-administered encounter medications on file as of 5/8/2018.        Again, thank you for allowing me to participate in the care of your patient.      Sincerely,    Joana Sheppard,      [facility]

## 2018-05-08 NOTE — LETTER
5/8/2018    Aixa Brantley MD, MD  [facility] [address]  [facility] [address]    RE: Marli Nguyen       Dear Colleague,    I had the pleasure of seeing Marli Nguyen in the [facility].    HPI and Plan:   See dictation    Orders Placed This Encounter   Procedures     Follow-Up with Cardiologist       Orders Placed This Encounter   Medications     ranitidine (ZANTAC) 150 MG tablet     Sig: Take 1 tablet (150 mg) by mouth 2 times daily     Dispense:  60 tablet     Refill:  1       There are no discontinued medications.      Encounter Diagnoses   Name Primary?     Coronary artery disease involving native coronary artery of native heart without angina pectoris      Gastroesophageal reflux disease without esophagitis Yes     Atypical chest pain        CURRENT MEDICATIONS:  Current Outpatient Prescriptions   Medication Sig Dispense Refill     amLODIPine (NORVASC) 5 MG tablet Take 1 tablet (5 mg) by mouth 2 times daily 180 tablet 2     arginine 500 MG tablet Take 1 tablet (500 mg) by mouth 2 times daily 180 tablet 3     aspirin 81 MG EC tablet Take 162 mg by mouth At Bedtime        CALCIUM-VITAMIN D PO Take 1 tablet by mouth every evening (strength unknown, does note that calcium is 800 mg)       COENZYME Q-10 PO Take 100 mg by mouth daily       Ezetimibe (ZETIA PO) Take 10 mg by mouth every morning       isosorbide mononitrate (IMDUR) 60 MG 24 hr tablet Take 1 tablet (60 mg) by mouth daily 90 tablet 0     Levothyroxine Sodium (SYNTHROID PO) Take 88 mcg by mouth daily        liothyronine (CYTOMEL) 5 MCG tablet Take 5 mcg by mouth daily       MAGNESIUM OXIDE PO Take 500 mg by mouth every evening        metoprolol tartrate (LOPRESSOR) 25 MG tablet Take 1 tablet (25 mg) by mouth 2 times daily 180 tablet 2     nitroglycerin (NITROSTAT) 0.4 MG sublingual tablet Place 1 tablet (0.4 mg) under the tongue every 5 minutes as needed 25 tablet 3     NONFORMULARY Take 1  tablet by mouth every morning Vitamin K complex 150mcg OTC       Omega-3 Fatty Acids (OMEGA-3 FISH OIL PO) Take 1.2 g by mouth 2 times daily        ranitidine (ZANTAC) 150 MG tablet Take 1 tablet (150 mg) by mouth 2 times daily 60 tablet 1     Rosuvastatin Calcium (CRESTOR PO) Take 40 mg by mouth daily       VITAMIN D, CHOLECALCIFEROL, PO Take 5,000 Units by mouth every evening          ALLERGIES     Allergies   Allergen Reactions     Bactrim [Sulfamethoxazole W/Trimethoprim]      rash     Erythromycin Cramps     Sulfa Drugs      Tape [Adhesive Tape]        PAST MEDICAL HISTORY:  Past Medical History:   Diagnosis Date     Angina pectoris (H) 6/24/2013     Cardiac microvascular disease (H)      Chronic left hip pain      Coronary artery disease     s/p CABG 2006 (LIMA to LAD and saphneous vein graft to OM1 and OM3), and KERRI to left main in 2013     DM type 2 (diabetes mellitus, type 2) (H)      Gallstone pancreatitis 2012     Graves disease     s/p radioiodide therapy in 2001, now takes synthroid     Hyperlipidemia      Hypertension      SIRS (systemic inflammatory response syndrome) (H) 9/8/2012     Unstable angina (H) 10/12/2016       PAST SURGICAL HISTORY:  Past Surgical History:   Procedure Laterality Date     CARPAL TUNNEL RELEASE RT/LT       CHOLECYSTECTOMY  4/12     CORONARY ANGIOGRAPHY ADULT ORDER  11/2006    70% ostial left main lesion, 50-60% mid LAD stenosis prox. to LIMA insertion site, circumflex with widely patent saph vein graft into first obtuse marginal, tiny posterolateral branch occluded     CORONARY ANGIOGRAPHY ADULT ORDER  12/2006    attempted circ OM3 PTCA     CORONARY ANGIOGRAPHY ADULT ORDER  6/2013    KERRI to ostial left main, widely patent LIMA to LAD and saphenous vein graft     CORONARY ANGIOGRAPHY ADULT ORDER  1/2014    left main stent widely patent, LIMA to LAD and vein graft to first obtuse marginal patent     CORONARY ANGIOGRAPHY ADULT ORDER  10/13/16    Left main:there is evidence of  previous stent implantation in the left main with no evidence of restenosis. The distal and mid LAD are free of signigficant narrowing. The bypass graft to the marginal branch is widely patent. The study is unchanged since her last study on 11/14/2014.     CORONARY ARTERY BYPASS  3/2006    LIMA to LAD, sequential SV grafts to OM1 and OM3     DILATION AND CURETTAGE, HYSTEROSCOPY, ABLATE ENDOMETRIUM NOVASURE, COMBINED  6/28/2012    Procedure: COMBINED DILATION AND CURETTAGE, HYSTEROSCOPY, ABLATE ENDOMETRIUM NOVASURE;  DILATION AND CURETTAGE, HYSTEROSCOPY, ABLATE ENDOMETRIUM,pelvic exam under anesthesia;  Surgeon: Johnna Harley MD;  Location: [facility] OR     LAPAROSCOPIC HYSTERECTOMY TOTAL, BILATERAL SALPINGO-OOPHORECTOMY, COMBINED  9/20/2012    Procedure: COMBINED LAPAROSCOPIC HYSTERECTOMY TOTAL, SALPINGO-OOPHORECTOMY;  LAPAROSCOPIC HYSTERECTOMY TOTAL, SALPINGO-OOPHORECTOMY;  Surgeon: Johnna Harley MD;  Location: [facility] OR     LASIK BILATERAL       REPAIR VAGINAL CUFF  10/31/2012    Procedure: REPAIR VAGINAL CUFF;  Irrigation and repair of Vaginal Cuff;  Surgeon: Johnna Harley MD;  Location: [facility] OR     Right thumb mass excision  2008     [place] teeth removal         FAMILY HISTORY:  Family History   Problem Relation Age of Onset     Hyperlipidemia Mother      Unknown/Adopted Father      DIABETES Paternal Grandmother        SOCIAL HISTORY:  Social History     Social History     Marital status:      Spouse name: N/A     Number of children: N/A     Years of education: N/A     Social History Main Topics     Smoking status: Never Smoker     Smokeless tobacco: Never Used     Alcohol use 0.0 oz/week     0 Standard drinks or equivalent per week      Comment: rare     Drug use: No     Sexual activity: Not Asked     Other Topics Concern     Caffeine Concern Yes     10 oz of coffee per day     Sleep Concern No     Weight Concern Yes     weight gain     Special Diet Yes     gluten free     Exercise Yes     walking 2 miles  "daily     Social History Narrative       Review of Systems:  Skin:  Positive for   losing hair   Eyes:  Positive for glasses    ENT:  Negative for      Respiratory:  Positive for shortness of breath;dyspnea on exertion     Cardiovascular:    Positive for;edema chest pressure after left arm hurts  Gastroenterology: Negative for      Genitourinary:  not assessed      Musculoskeletal:  Negative for      Neurologic:  Positive for numbness or tingling of feet    Psychiatric:  Positive for sleep disturbances    Heme/Lymph/Imm:  Positive for allergies    Endocrine:  Positive for thyroid disorder      Physical Exam:  Vitals: /68  Pulse 60  Ht 1.6 m (5' 3\")  Wt 90.7 kg (200 lb)  BMI 35.43 kg/m2    Constitutional:  cooperative, alert and oriented, well developed, well nourished, in no acute distress        Skin:  warm and dry to the touch          Head:  normocephalic        Eyes:  pupils equal and round        Lymph:      ENT:  dentition good        Neck:  JVP normal JVP elevated      Respiratory:  healed median sternotomy scar;clear to auscultation         Cardiac: regular rhythm;normal S1 and S2       systolic murmur        pulses full and equal                                        GI:  abdomen soft        Extremities and Muscular Skeletal:  no edema;no deformities, clubbing, cyanosis, erythema observed              Neurological:  no gross motor deficits        Psych:  Alert and Oriented x 3          CC  Aixa Brantley MD  [facility]  [address] [id number]                    Thank you for allowing me to participate in the care of your patient.      Sincerely,     Joana Sheppard,      [facility]    cc:   Aixa Brantley MD  [facility]  [address] [place] [address]        "

## 2018-05-09 NOTE — PROGRESS NOTES
Service Date: 05/08/2018      REFERRING PHYSICIAN:  Dr. Aixa Brantley.      HISTORY OF PRESENT ILLNESS:  Ms. Nguyen is a very pleasant 58-year-old female with a history of coronary disease, previous 2-vessel CABG and preserved LV systolic function.  She also has had left main stenting in 2013.  She is here for a followup visit.  She has been seen in the emergency room in April for symptoms of chest pain.  She underwent a stress echocardiogram that was normal.  She is continuing to have intermittent symptoms of chest discomfort mainly occurring at nighttime when she is supine.  She also notes this is waking her up, and she feels like she stops breathing at times during the night.  She feels better if she sits up in a recliner, her symptoms do go away.  She has not tried sublingual nitroglycerin for this.  I did review her stress echocardiogram which was performed on 04/17.  She walked on a standard [place] protocol for 8 minutes without EKG changes or echocardiographic changes.  Her LV function augmented with exercise without regional wall motion abnormality.      PHYSICAL EXAMINATION:   VITAL SIGNS:  On exam today, her blood pressure is 120/68, pulse is 60, weight is 200 pounds, which is up about 12 pounds from last year.  Body mass index is 35.   NECK:  Carotid upstrokes seem brisk without bruit.   CARDIOVASCULAR:  Tones are regular without murmur, gallop or rub.   LUNGS:  Clear posteriorly.   EXTREMITIES:  She has no peripheral edema on exam.      ASSESSMENT AND PLAN:  In summary, Ms. Nguyen is a pleasant 58-year-old female with a history of 2-vessel CABG, preserved LV systolic function with recurrent intermittent atypical chest pains that are occurring mainly while supine at night and some unusual breathing issue.  It does not sound like orthopnea.  She is not having difficulty breathing during the day or with exertion.  I did, however, perform an NT-proBNP upon her phone call to schedule this appointment today, and  that was normal, it came back at 115.  With recent stress testing that I also reviewed this looks completely normal as well.  I have suggested that this might be related to gastroesophageal reflux disease given her description of her history and the fact that it occurs only when supine and at nighttime.  I would like to try an antiacid medication first before titrating of any of her antianginal medications and have suggested Zantac 150 mg twice daily for 2 weeks.  She is willing to give this a try, and we will have her follow up in clinic to see if this is helpful in reducing her symptoms.  It sounds like she is planning on retiring.  There is a lot of stress going on in her life with her job and some other activities that she has since retired from.  She is planning on retiring in [place] soon, which probably will help with her general health as well.  I will see her back in clinic within the next month in followup.  Please feel free to contact me with any questions you have in regards to her care.      cc:   [place] [address]         ZANE RLOON DO             D: 2018   T: 2018   MT: MARTHA      Name:     AIYANA BETH   MRN:      [id number]        Account:      [id number]   :      1960           Service Date: 2018      Document: [id number]

## 2018-05-10 ENCOUNTER — TELEPHONE (OUTPATIENT)
Dept: LAB | Facility: CLINIC | Age: 58
End: 2018-05-10

## 2018-05-10 ENCOUNTER — TELEPHONE (OUTPATIENT)
Dept: CARDIOLOGY | Facility: CLINIC | Age: 58
End: 2018-05-10

## 2018-05-10 NOTE — TELEPHONE ENCOUNTER
Left voicemail for pt discussing lab results from 5/8. Explained that results were within normal range and pt should be able to view them through her MyChart. Left Team 1's call back number if pt has any questions or concerns. Pt is scheduled to follow up with Dr. Sheppard on 6/6.

## 2018-06-06 ENCOUNTER — OFFICE VISIT (OUTPATIENT)
Dept: CARDIOLOGY | Facility: CLINIC | Age: 58
End: 2018-06-06
Attending: INTERNAL MEDICINE
Payer: COMMERCIAL

## 2018-06-06 VITALS
HEIGHT: 64 IN | WEIGHT: 204.6 LBS | SYSTOLIC BLOOD PRESSURE: 118 MMHG | BODY MASS INDEX: 34.93 KG/M2 | DIASTOLIC BLOOD PRESSURE: 70 MMHG | HEART RATE: 68 BPM

## 2018-06-06 DIAGNOSIS — R07.89 ATYPICAL CHEST PAIN: ICD-10-CM

## 2018-06-06 DIAGNOSIS — I25.10 CORONARY ARTERY DISEASE INVOLVING NATIVE CORONARY ARTERY OF NATIVE HEART WITHOUT ANGINA PECTORIS: ICD-10-CM

## 2018-06-06 DIAGNOSIS — K21.9 GASTROESOPHAGEAL REFLUX DISEASE WITHOUT ESOPHAGITIS: ICD-10-CM

## 2018-06-06 PROCEDURE — 99213 OFFICE O/P EST LOW 20 MIN: CPT | Performed by: INTERNAL MEDICINE

## 2018-06-06 NOTE — PROGRESS NOTES
Service Date: 06/06/2018      REFERRING PHYSICIAN:  Dr. Aixa Brantley.      HISTORY OF PRESENT ILLNESS:  Ms. Nguyen is a very pleasant 58-year-old female with a history of coronary artery disease, previous 2-vessel CABG, preserved LV systolic function.  She had left main stenting in 2013.  She had some atypical chest pain symptoms that were occurring mainly at nighttime.  She did have cardiac testing including an echocardiogram and stress testing that were unremarkable.  She also was having some unusual breathing issues at night that did not sound like orthopnea, but I did test an NT-proBNP that came back completely normal as well.  After discussing her symptoms last May, I had suggested trying to increase her antacids, that it might be related to gastroesophageal reflux disease since it was occurring only at nighttime while she was resting.  I had suggested increasing Zantac to 150 mg twice daily.  She returns to clinic today stating that she did not really feel helped, although it does not sound like she is having consistent nightly chest pain anymore.  Her main concern today was with increased fatigue.  She thinks that her thyroid may be off and she states that she is concerned that she may need a higher dose of thyroid supplement.  She told me that Dr. Munoz has expressed concern in the past about titrating her thyroid supplementation due to the cardiac risks and we did talk a little bit about that today.      PHYSICAL EXAMINATION:   On exam, her blood pressure is 118/70, pulse is 68, weight 204.  Body mass index of 35.  Physical exam findings were otherwise unchanged from her previous visit last month.      SUMMARY:  Ms. Nguyen is a very pleasant 58-year-old female with a history of 2-vessel CABG and stenting to her left main in 2013.  She has preserved LV systolic function.  She has been having some atypical chest pain symptoms and breathing difficulties.  All of her cardiac testing has come back negative, are  unremarkable.  She did do a trial of higher dose of antacid in the last couple of weeks, which she states has made any difference, but it sounds to me like her chest pain symptoms have somewhat resolved.  She only recalled 1 episode in the last 2 weeks.  Her breathing difficulties are also resolved too.  Her only complaint today was mainly the concern about her increased fatigue levels and whether or not she may need thyroid adjustment.  I do not have any issue with her increasing her thyroid medication.  I did caution her.  It is common to have increased palpitations with up titration of her thyroid supplementation.  We may be able to combat this if it occurs by in the short-term increasing her beta blocker, but she has had normal cardiac testing within the last 3 months.  I am not concerned about any risks from a cardiac perspective with titrating up her thyroid medication.  She feels comfortable and is not having any exertional symptoms and in fact has been more active recently outdoors without any chest pain symptoms.  Therefore, I feel she is very stable from a cardiac perspective and I will be happy to continue to see her on an annual basis or as needed.  Please feel free to contact me with any questions you have in regards to her care.      cc:   [place] [address]      AGUILAR Munoz MD   [facility] [address]  [id number]         ZANE ROLON DO             D: 2018   T: 2018   MT: VALERI      Name:     AIYANA BETH   MRN:      [id number]        Account:      [id number]   :      1960           Service Date: 2018      Document: [id number]

## 2018-06-06 NOTE — LETTER
6/6/2018    Aixa Brantley MD, MD  [facility] [address]  [facility] [address]    RE: Marli Nguyen       Dear Colleague,    I had the pleasure of seeing Marli Nguyen in the [facility].    HPI and Plan:   See dictation    No orders of the defined types were placed in this encounter.      Orders Placed This Encounter   Medications     Minoxidil (ROGAINE MENS) 5 % FOAM     Sig: Externally apply topically 2 times daily     UNABLE TO FIND     Sig: daily MEDICATION NAME: Nutrafol women's hair loss vitamin       There are no discontinued medications.      Encounter Diagnoses   Name Primary?     Coronary artery disease involving native coronary artery of native heart without angina pectoris      Gastroesophageal reflux disease without esophagitis      Atypical chest pain        CURRENT MEDICATIONS:  Current Outpatient Prescriptions   Medication Sig Dispense Refill     amLODIPine (NORVASC) 5 MG tablet Take 1 tablet (5 mg) by mouth 2 times daily 180 tablet 2     arginine 500 MG tablet Take 1 tablet (500 mg) by mouth 2 times daily 180 tablet 3     aspirin 81 MG EC tablet Take 162 mg by mouth At Bedtime        CALCIUM-VITAMIN D PO Take 1 tablet by mouth every evening (strength unknown, does note that calcium is 800 mg)       COENZYME Q-10 PO Take 100 mg by mouth daily       Ezetimibe (ZETIA PO) Take 10 mg by mouth every morning       isosorbide mononitrate (IMDUR) 60 MG 24 hr tablet Take 1 tablet (60 mg) by mouth daily 90 tablet 0     Levothyroxine Sodium (SYNTHROID PO) Take 88 mcg by mouth daily        liothyronine (CYTOMEL) 5 MCG tablet Take 5 mcg by mouth daily       MAGNESIUM OXIDE PO Take 500 mg by mouth every evening        metoprolol tartrate (LOPRESSOR) 25 MG tablet Take 1 tablet (25 mg) by mouth 2 times daily 180 tablet 2     Minoxidil (ROGAINE MENS) 5 % FOAM Externally apply topically 2 times daily       nitroglycerin (NITROSTAT) 0.4 MG sublingual tablet  Place 1 tablet (0.4 mg) under the tongue every 5 minutes as needed 25 tablet 3     NONFORMULARY Take 1 tablet by mouth every morning Vitamin K complex 150mcg OTC       Omega-3 Fatty Acids (OMEGA-3 FISH OIL PO) Take 1.2 g by mouth 2 times daily        ranitidine (ZANTAC) 150 MG tablet Take 1 tablet (150 mg) by mouth 2 times daily 60 tablet 1     Rosuvastatin Calcium (CRESTOR PO) Take 40 mg by mouth daily       UNABLE TO FIND daily MEDICATION NAME: Nutrafol women's hair loss vitamin       VITAMIN D, CHOLECALCIFEROL, PO Take 5,000 Units by mouth every evening          ALLERGIES     Allergies   Allergen Reactions     Bactrim [Sulfamethoxazole W/Trimethoprim]      rash     Erythromycin Cramps     Sulfa Drugs      Tape [Adhesive Tape]        PAST MEDICAL HISTORY:  Past Medical History:   Diagnosis Date     Angina pectoris (H) 6/24/2013     Cardiac microvascular disease (H)      Chronic left hip pain      Coronary artery disease     s/p CABG 2006 (LIMA to LAD and saphneous vein graft to OM1 and OM3), and KERRI to left main in 2013     DM type 2 (diabetes mellitus, type 2) (H)      Gallstone pancreatitis 2012     Graves disease     s/p radioiodide therapy in 2001, now takes synthroid     Hyperlipidemia      Hypertension      SIRS (systemic inflammatory response syndrome) (H) 9/8/2012     Unstable angina (H) 10/12/2016       PAST SURGICAL HISTORY:  Past Surgical History:   Procedure Laterality Date     CARPAL TUNNEL RELEASE RT/LT       CHOLECYSTECTOMY  4/12     CORONARY ANGIOGRAPHY ADULT ORDER  11/2006    70% ostial left main lesion, 50-60% mid LAD stenosis prox. to LIMA insertion site, circumflex with widely patent saph vein graft into first obtuse marginal, tiny posterolateral branch occluded     CORONARY ANGIOGRAPHY ADULT ORDER  12/2006    attempted circ OM3 PTCA     CORONARY ANGIOGRAPHY ADULT ORDER  6/2013    KERRI to ostial left main, widely patent LIMA to LAD and saphenous vein graft     CORONARY ANGIOGRAPHY ADULT ORDER   1/2014    left main stent widely patent, LIMA to LAD and vein graft to first obtuse marginal patent     CORONARY ANGIOGRAPHY ADULT ORDER  10/13/16    Left main:there is evidence of previous stent implantation in the left main with no evidence of restenosis. The distal and mid LAD are free of signigficant narrowing. The bypass graft to the marginal branch is widely patent. The study is unchanged since her last study on 11/14/2014.     CORONARY ARTERY BYPASS  3/2006    LIMA to LAD, sequential SV grafts to OM1 and OM3     DILATION AND CURETTAGE, HYSTEROSCOPY, ABLATE ENDOMETRIUM NOVASURE, COMBINED  6/28/2012    Procedure: COMBINED DILATION AND CURETTAGE, HYSTEROSCOPY, ABLATE ENDOMETRIUM NOVASURE;  DILATION AND CURETTAGE, HYSTEROSCOPY, ABLATE ENDOMETRIUM,pelvic exam under anesthesia;  Surgeon: Johnna Harley MD;  Location: [facility] OR     LAPAROSCOPIC HYSTERECTOMY TOTAL, BILATERAL SALPINGO-OOPHORECTOMY, COMBINED  9/20/2012    Procedure: COMBINED LAPAROSCOPIC HYSTERECTOMY TOTAL, SALPINGO-OOPHORECTOMY;  LAPAROSCOPIC HYSTERECTOMY TOTAL, SALPINGO-OOPHORECTOMY;  Surgeon: Johnna Harley MD;  Location: [facility] OR     LASIK BILATERAL       REPAIR VAGINAL CUFF  10/31/2012    Procedure: REPAIR VAGINAL CUFF;  Irrigation and repair of Vaginal Cuff;  Surgeon: Johnna Harley MD;  Location: [facility] OR     Right thumb mass excision  2008     [place] teeth removal         FAMILY HISTORY:  Family History   Problem Relation Age of Onset     Hyperlipidemia Mother      Unknown/Adopted Father      DIABETES Paternal Grandmother        SOCIAL HISTORY:  Social History     Social History     Marital status:      Spouse name: N/A     Number of children: N/A     Years of education: N/A     Social History Main Topics     Smoking status: Never Smoker     Smokeless tobacco: Never Used     Alcohol use 0.0 oz/week     0 Standard drinks or equivalent per week      Comment: rare     Drug use: No     Sexual activity: Not Asked     Other Topics Concern      "Caffeine Concern Yes     10 oz of coffee per day     Sleep Concern No     Weight Concern Yes     weight gain     Special Diet Yes     gluten free     Exercise Yes     walking 2 miles daily     Social History Narrative       Review of Systems:  Skin:  Positive for hair changes hair loss-saw dermatologist   Eyes:  Positive for glasses    ENT:  Negative      Respiratory:  Positive for dyspnea on exertion     Cardiovascular:    chest pain;Positive for;fatigue;edema occ  Gastroenterology: Negative      Genitourinary:  Negative      Musculoskeletal:  Negative      Neurologic:  Negative      Psychiatric:  Positive for anxiety unsure about thyroid medication/heart problems  Heme/Lymph/Imm:  Positive for allergies gluten, lactose  Endocrine:  Positive for thyroid disorder      Physical Exam:  Vitals: /70  Pulse 68  Ht 1.613 m (5' 3.5\")  Wt 92.8 kg (204 lb 9.6 oz)  BMI 35.67 kg/m2    Constitutional:  cooperative, alert and oriented, well developed, well nourished, in no acute distress        Skin:  warm and dry to the touch          Head:  normocephalic        Eyes:  pupils equal and round        Lymph:      ENT:  dentition good        Neck:  JVP normal JVP elevated      Respiratory:  healed median sternotomy scar;clear to auscultation    bruit in left upper chest    Cardiac: regular rhythm;normal S1 and S2       systolic murmur        pulses full and equal                                        GI:  abdomen soft        Extremities and Muscular Skeletal:  no edema;no deformities, clubbing, cyanosis, erythema observed              Neurological:  no gross motor deficits        Psych:  Alert and Oriented x 3          CC  Joana Sheppard DO  [address]  [place] [address]                    Thank you for allowing me to participate in the care of your patient.      Sincerely,     Joana Sheppard DO     [facility]    cc:   Joana Sheppard DO  [address]  [place] [address]

## 2018-06-06 NOTE — LETTER
6/6/2018      Aixa Brantley MD, MD  [facility] [address]  [facility] [address]      RE: Marli Nguyen       Dear Colleague,    I had the pleasure of seeing Marli Nguyen in the [facility].    Service Date: 06/06/2018      REFERRING PHYSICIAN:  Dr. Aixa Brantley.      HISTORY OF PRESENT ILLNESS:  Ms. Nguyen is a very pleasant 58-year-old female with a history of coronary artery disease, previous 2-vessel CABG, preserved LV systolic function.  She had left main stenting in 2013.  She had some atypical chest pain symptoms that were occurring mainly at nighttime.  She did have cardiac testing including an echocardiogram and stress testing that were unremarkable.  She also was having some unusual breathing issues at night that did not sound like orthopnea, but I did test an NT-proBNP that came back completely normal as well.  After discussing her symptoms last May, I had suggested trying to increase her antacids, that it might be related to gastroesophageal reflux disease since it was occurring only at nighttime while she was resting.  I had suggested increasing Zantac to 150 mg twice daily.  She returns to clinic today stating that she did not really feel helped, although it does not sound like she is having consistent nightly chest pain anymore.  Her main concern today was with increased fatigue.  She thinks that her thyroid may be off and she states that she is concerned that she may need a higher dose of thyroid supplement.  She told me that Dr. Munoz has expressed concern in the past about titrating her thyroid supplementation due to the cardiac risks and we did talk a little bit about that today.      PHYSICAL EXAMINATION:   On exam, her blood pressure is 118/70, pulse is 68, weight 204.  Body mass index of 35.  Physical exam findings were otherwise unchanged from her previous visit last month.      SUMMARY:  Ms. Nguyen is a very pleasant 58-year-old  female with a history of 2-vessel CABG and stenting to her left main in .  She has preserved LV systolic function.  She has been having some atypical chest pain symptoms and breathing difficulties.  All of her cardiac testing has come back negative, are unremarkable.  She did do a trial of higher dose of antacid in the last couple of weeks, which she states has made any difference, but it sounds to me like her chest pain symptoms have somewhat resolved.  She only recalled 1 episode in the last 2 weeks.  Her breathing difficulties are also resolved too.  Her only complaint today was mainly the concern about her increased fatigue levels and whether or not she may need thyroid adjustment.  I do not have any issue with her increasing her thyroid medication.  I did caution her.  It is common to have increased palpitations with up titration of her thyroid supplementation.  We may be able to combat this if it occurs by in the short-term increasing her beta blocker, but she has had normal cardiac testing within the last 3 months.  I am not concerned about any risks from a cardiac perspective with titrating up her thyroid medication.  She feels comfortable and is not having any exertional symptoms and in fact has been more active recently outdoors without any chest pain symptoms.  Therefore, I feel she is very stable from a cardiac perspective and I will be happy to continue to see her on an annual basis or as needed.  Please feel free to contact me with any questions you have in regards to her care.      cc:   Aixa Brantley MD   [address]  [address]      AGUILAR Munoz MD   [facility] [address]  [address]         ZANE ROLON DO             D: 2018   T: 2018   MT: VALERI      Name:     AIYANA BETH   MRN:      [id number]        Account:      [id number]   :       1960           Service Date: 06/06/2018      Document: [id number]         Outpatient Encounter Prescriptions as of 6/6/2018   Medication Sig Dispense Refill     amLODIPine (NORVASC) 5 MG tablet Take 1 tablet (5 mg) by mouth 2 times daily 180 tablet 2     arginine 500 MG tablet Take 1 tablet (500 mg) by mouth 2 times daily 180 tablet 3     aspirin 81 MG EC tablet Take 162 mg by mouth At Bedtime        CALCIUM-VITAMIN D PO Take 1 tablet by mouth every evening (strength unknown, does note that calcium is 800 mg)       COENZYME Q-10 PO Take 100 mg by mouth daily       Ezetimibe (ZETIA PO) Take 10 mg by mouth every morning       isosorbide mononitrate (IMDUR) 60 MG 24 hr tablet Take 1 tablet (60 mg) by mouth daily 90 tablet 0     Levothyroxine Sodium (SYNTHROID PO) Take 88 mcg by mouth daily        liothyronine (CYTOMEL) 5 MCG tablet Take 5 mcg by mouth daily       MAGNESIUM OXIDE PO Take 500 mg by mouth every evening        metoprolol tartrate (LOPRESSOR) 25 MG tablet Take 1 tablet (25 mg) by mouth 2 times daily 180 tablet 2     Minoxidil (ROGAINE MENS) 5 % FOAM Externally apply topically 2 times daily       nitroglycerin (NITROSTAT) 0.4 MG sublingual tablet Place 1 tablet (0.4 mg) under the tongue every 5 minutes as needed 25 tablet 3     NONFORMULARY Take 1 tablet by mouth every morning Vitamin K complex 150mcg OTC       Omega-3 Fatty Acids (OMEGA-3 FISH OIL PO) Take 1.2 g by mouth 2 times daily        ranitidine (ZANTAC) 150 MG tablet Take 1 tablet (150 mg) by mouth 2 times daily 60 tablet 1     Rosuvastatin Calcium (CRESTOR PO) Take 40 mg by mouth daily       UNABLE TO FIND daily MEDICATION NAME: Nutrafol women's hair loss vitamin       VITAMIN D, CHOLECALCIFEROL, PO Take 5,000 Units by mouth every evening        No facility-administered encounter medications on file as of 6/6/2018.        Again, thank you for allowing me to participate in the care of your patient.      Sincerely,    Joana Calles  DO Valarie     [facility]

## 2018-06-06 NOTE — PROGRESS NOTES
HPI and Plan:   See dictation    No orders of the defined types were placed in this encounter.      Orders Placed This Encounter   Medications     Minoxidil (ROGAINE MENS) 5 % FOAM     Sig: Externally apply topically 2 times daily     UNABLE TO FIND     Sig: daily MEDICATION NAME: Nutrafol women's hair loss vitamin       There are no discontinued medications.      Encounter Diagnoses   Name Primary?     Coronary artery disease involving native coronary artery of native heart without angina pectoris      Gastroesophageal reflux disease without esophagitis      Atypical chest pain        CURRENT MEDICATIONS:  Current Outpatient Prescriptions   Medication Sig Dispense Refill     amLODIPine (NORVASC) 5 MG tablet Take 1 tablet (5 mg) by mouth 2 times daily 180 tablet 2     arginine 500 MG tablet Take 1 tablet (500 mg) by mouth 2 times daily 180 tablet 3     aspirin 81 MG EC tablet Take 162 mg by mouth At Bedtime        CALCIUM-VITAMIN D PO Take 1 tablet by mouth every evening (strength unknown, does note that calcium is 800 mg)       COENZYME Q-10 PO Take 100 mg by mouth daily       Ezetimibe (ZETIA PO) Take 10 mg by mouth every morning       isosorbide mononitrate (IMDUR) 60 MG 24 hr tablet Take 1 tablet (60 mg) by mouth daily 90 tablet 0     Levothyroxine Sodium (SYNTHROID PO) Take 88 mcg by mouth daily        liothyronine (CYTOMEL) 5 MCG tablet Take 5 mcg by mouth daily       MAGNESIUM OXIDE PO Take 500 mg by mouth every evening        metoprolol tartrate (LOPRESSOR) 25 MG tablet Take 1 tablet (25 mg) by mouth 2 times daily 180 tablet 2     Minoxidil (ROGAINE MENS) 5 % FOAM Externally apply topically 2 times daily       nitroglycerin (NITROSTAT) 0.4 MG sublingual tablet Place 1 tablet (0.4 mg) under the tongue every 5 minutes as needed 25 tablet 3     NONFORMULARY Take 1 tablet by mouth every morning Vitamin K complex 150mcg OTC       Omega-3 Fatty Acids (OMEGA-3 FISH OIL PO) Take 1.2 g by mouth 2 times daily         ranitidine (ZANTAC) 150 MG tablet Take 1 tablet (150 mg) by mouth 2 times daily 60 tablet 1     Rosuvastatin Calcium (CRESTOR PO) Take 40 mg by mouth daily       UNABLE TO FIND daily MEDICATION NAME: Nutrafol women's hair loss vitamin       VITAMIN D, CHOLECALCIFEROL, PO Take 5,000 Units by mouth every evening          ALLERGIES     Allergies   Allergen Reactions     Bactrim [Sulfamethoxazole W/Trimethoprim]      rash     Erythromycin Cramps     Sulfa Drugs      Tape [Adhesive Tape]        PAST MEDICAL HISTORY:  Past Medical History:   Diagnosis Date     Angina pectoris (H) 6/24/2013     Cardiac microvascular disease (H)      Chronic left hip pain      Coronary artery disease     s/p CABG 2006 (LIMA to LAD and saphneous vein graft to OM1 and OM3), and KERRI to left main in 2013     DM type 2 (diabetes mellitus, type 2) (H)      Gallstone pancreatitis 2012     Graves disease     s/p radioiodide therapy in 2001, now takes synthroid     Hyperlipidemia      Hypertension      SIRS (systemic inflammatory response syndrome) (H) 9/8/2012     Unstable angina (H) 10/12/2016       PAST SURGICAL HISTORY:  Past Surgical History:   Procedure Laterality Date     CARPAL TUNNEL RELEASE RT/LT       CHOLECYSTECTOMY  4/12     CORONARY ANGIOGRAPHY ADULT ORDER  11/2006    70% ostial left main lesion, 50-60% mid LAD stenosis prox. to LIMA insertion site, circumflex with widely patent saph vein graft into first obtuse marginal, tiny posterolateral branch occluded     CORONARY ANGIOGRAPHY ADULT ORDER  12/2006    attempted circ OM3 PTCA     CORONARY ANGIOGRAPHY ADULT ORDER  6/2013    KERRI to ostial left main, widely patent LIMA to LAD and saphenous vein graft     CORONARY ANGIOGRAPHY ADULT ORDER  1/2014    left main stent widely patent, LIMA to LAD and vein graft to first obtuse marginal patent     CORONARY ANGIOGRAPHY ADULT ORDER  10/13/16    Left main:there is evidence of previous stent implantation in the left main with no evidence of  restenosis. The distal and mid LAD are free of signigficant narrowing. The bypass graft to the marginal branch is widely patent. The study is unchanged since her last study on 11/14/2014.     CORONARY ARTERY BYPASS  3/2006    LIMA to LAD, sequential SV grafts to OM1 and OM3     DILATION AND CURETTAGE, HYSTEROSCOPY, ABLATE ENDOMETRIUM NOVASURE, COMBINED  6/28/2012    Procedure: COMBINED DILATION AND CURETTAGE, HYSTEROSCOPY, ABLATE ENDOMETRIUM NOVASURE;  DILATION AND CURETTAGE, HYSTEROSCOPY, ABLATE ENDOMETRIUM,pelvic exam under anesthesia;  Surgeon: Johnna Harley MD;  Location: [facility] OR     LAPAROSCOPIC HYSTERECTOMY TOTAL, BILATERAL SALPINGO-OOPHORECTOMY, COMBINED  9/20/2012    Procedure: COMBINED LAPAROSCOPIC HYSTERECTOMY TOTAL, SALPINGO-OOPHORECTOMY;  LAPAROSCOPIC HYSTERECTOMY TOTAL, SALPINGO-OOPHORECTOMY;  Surgeon: Johnna Harley MD;  Location: [facility] OR     LASIK BILATERAL       REPAIR VAGINAL CUFF  10/31/2012    Procedure: REPAIR VAGINAL CUFF;  Irrigation and repair of Vaginal Cuff;  Surgeon: Johnna Harley MD;  Location: [facility] OR     Right thumb mass excision  2008     Liberty teeth removal         FAMILY HISTORY:  Family History   Problem Relation Age of Onset     Hyperlipidemia Mother      Unknown/Adopted Father      DIABETES Paternal Grandmother        SOCIAL HISTORY:  Social History     Social History     Marital status:      Spouse name: N/A     Number of children: N/A     Years of education: N/A     Social History Main Topics     Smoking status: Never Smoker     Smokeless tobacco: Never Used     Alcohol use 0.0 oz/week     0 Standard drinks or equivalent per week      Comment: rare     Drug use: No     Sexual activity: Not Asked     Other Topics Concern     Caffeine Concern Yes     10 oz of coffee per day     Sleep Concern No     Weight Concern Yes     weight gain     Special Diet Yes     gluten free     Exercise Yes     walking 2 miles daily     Social History Narrative       Review of  "Systems:  Skin:  Positive for hair changes hair loss-saw dermatologist   Eyes:  Positive for glasses    ENT:  Negative      Respiratory:  Positive for dyspnea on exertion     Cardiovascular:    chest pain;Positive for;fatigue;edema occ  Gastroenterology: Negative      Genitourinary:  Negative      Musculoskeletal:  Negative      Neurologic:  Negative      Psychiatric:  Positive for anxiety unsure about thyroid medication/heart problems  Heme/Lymph/Imm:  Positive for allergies gluten, lactose  Endocrine:  Positive for thyroid disorder      Physical Exam:  Vitals: /70  Pulse 68  Ht 1.613 m (5' 3.5\")  Wt 92.8 kg (204 lb 9.6 oz)  BMI 35.67 kg/m2    Constitutional:  cooperative, alert and oriented, well developed, well nourished, in no acute distress        Skin:  warm and dry to the touch          Head:  normocephalic        Eyes:  pupils equal and round        Lymph:      ENT:  dentition good        Neck:  JVP normal JVP elevated      Respiratory:  healed median sternotomy scar;clear to auscultation    bruit in left upper chest    Cardiac: regular rhythm;normal S1 and S2       systolic murmur        pulses full and equal                                        GI:  abdomen soft        Extremities and Muscular Skeletal:  no edema;no deformities, clubbing, cyanosis, erythema observed              Neurological:  no gross motor deficits        Psych:  Alert and Oriented x 3          CC  Joana Sheppard,   [id number]  [place] [address]                  "

## 2018-06-06 NOTE — MR AVS SNAPSHOT
After Visit Summary   6/6/2018    Marli Nguyen    MRN: [id number]           Patient Information     Date Of Birth          1960        Visit Information        Provider Department      6/6/2018 11:45 AM Joana Sheppard DO [facility]        Today's Diagnoses     Coronary artery disease involving native coronary artery of native heart without angina pectoris        Gastroesophageal reflux disease without esophagitis        Atypical chest pain           Follow-ups after your visit        Who to contact     If you have questions or need follow up information about today's clinic visit or your schedule please contact [facility] directly at [phone].  Normal or non-critical lab and imaging results will be communicated to you by MyChart, letter or phone within 4 business days after the clinic has received the results. If you do not hear from us within 7 days, please contact the clinic through [employer] or phone. If you have a critical or abnormal lab result, we will notify you by phone as soon as possible.  Submit refill requests through [employer] or call your pharmacy and they will forward the refill request to us. Please allow 3 business days for your refill to be completed.          Additional Information About Your Visit        MyChart Information     [employer] gives you secure access to your electronic health record. If you see a primary care provider, you can also send messages to your care team and make appointments. If you have questions, please call your primary care clinic.  If you do not have a primary care provider, please call [phone] and they will assist you.        Care EveryWhere ID     This is your Care EveryWhere ID. This could be used by other organizations to access your [place] medical records  [id number]        Your Vitals Were     Pulse Height BMI (Body Mass Index)             68  "1.613 m (5' 3.5\") 35.67 kg/m2          Blood Pressure from Last 3 Encounters:   06/06/18 118/70   05/08/18 120/68   04/17/18 110/57    Weight from Last 3 Encounters:   06/06/18 92.8 kg (204 lb 9.6 oz)   05/08/18 90.7 kg (200 lb)   04/16/18 93 kg (205 lb)              We Performed the Following     Follow-Up with Cardiologist        Primary Care Provider Office Phone # Fax #    Aixa Brantley [phone]       [facility] [address]  [facility] [address]        Equal Access to Services     MAZIN ALLISON : Hadii tevin dobsono Sobetty, waaxda luqadaha, qaybta kaalmada adeegyada, anabella crystal. So [facility] [phone].    ATENCIÓN: Si habla español, tiene a griffiths disposición servicios gratuitos de asistencia lingüística. Llame al [phone].    We comply with applicable federal civil rights laws and [place] laws. We do not discriminate on the basis of race, color, national origin, age, disability, sex, sexual orientation, or gender identity.            Thank you!     Thank you for choosing [facility]  for your care. Our goal is always to provide you with excellent care. Hearing back from our patients is one way we can continue to improve our services. Please take a few minutes to complete the written survey that you may receive in the mail after your visit with us. Thank you!             Your Updated Medication List - Protect others around you: Learn how to safely use, store and throw away your medicines at www.disposemymeds.org.          This list is accurate as of 6/6/18 12:17 PM.  Always use your most recent med list.                   Brand Name Dispense Instructions for use Diagnosis    amLODIPine 5 MG tablet    NORVASC    180 tablet    Take 1 tablet (5 mg) by mouth 2 times daily    Coronary artery disease involving native coronary artery of native heart without angina pectoris       arginine 500 MG tablet     " 180 tablet    Take 1 tablet (500 mg) by mouth 2 times daily    CAD (coronary artery disease)       aspirin 81 MG EC tablet      Take 162 mg by mouth At Bedtime        CALCIUM-VITAMIN D PO      Take 1 tablet by mouth every evening (strength unknown, does note that calcium is 800 mg)        COENZYME Q-10 PO      Take 100 mg by mouth daily        CRESTOR PO      Take 40 mg by mouth daily        CYTOMEL 5 MCG tablet   Generic drug:  liothyronine      Take 5 mcg by mouth daily        isosorbide mononitrate 60 MG 24 hr tablet    IMDUR    90 tablet    Take 1 tablet (60 mg) by mouth daily    CAD (coronary artery disease)       MAGNESIUM OXIDE PO      Take 500 mg by mouth every evening        metoprolol tartrate 25 MG tablet    LOPRESSOR    180 tablet    Take 1 tablet (25 mg) by mouth 2 times daily    Coronary artery disease involving native coronary artery of native heart with angina pectoris (H)       nitroGLYcerin 0.4 MG sublingual tablet    NITROSTAT    25 tablet    Place 1 tablet (0.4 mg) under the tongue every 5 minutes as needed    Unstable angina (H), Coronary artery disease involving native coronary artery of native heart without angina pectoris       NONFORMULARY      Take 1 tablet by mouth every morning Vitamin K complex 150mcg OTC        OMEGA-3 FISH OIL PO      Take 1.2 g by mouth 2 times daily        ranitidine 150 MG tablet    ZANTAC    60 tablet    Take 1 tablet (150 mg) by mouth 2 times daily    Gastroesophageal reflux disease without esophagitis       ROGAINE MENS 5 % Foam   Generic drug:  Minoxidil      Externally apply topically 2 times daily        SYNTHROID PO      Take 88 mcg by mouth daily        UNABLE TO FIND      daily MEDICATION NAME: Nutrafol women's hair loss vitamin        VITAMIN D (CHOLECALCIFEROL) PO      Take 5,000 Units by mouth every evening        ZETIA PO      Take 10 mg by mouth every morning

## 2018-08-21 ENCOUNTER — APPOINTMENT (OUTPATIENT)
Dept: GENERAL RADIOLOGY | Facility: CLINIC | Age: 58
End: 2018-08-21
Attending: INTERNAL MEDICINE
Payer: COMMERCIAL

## 2018-08-21 ENCOUNTER — HOSPITAL ENCOUNTER (EMERGENCY)
Facility: CLINIC | Age: 58
Discharge: HOME | End: 2018-08-21
Attending: INTERNAL MEDICINE | Admitting: INTERNAL MEDICINE
Payer: COMMERCIAL

## 2018-08-21 VITALS
SYSTOLIC BLOOD PRESSURE: 119 MMHG | OXYGEN SATURATION: 94 % | RESPIRATION RATE: 18 BRPM | HEART RATE: 66 BPM | BODY MASS INDEX: 36.32 KG/M2 | HEIGHT: 63 IN | WEIGHT: 205 LBS | DIASTOLIC BLOOD PRESSURE: 62 MMHG | TEMPERATURE: 97.9 F

## 2018-08-21 DIAGNOSIS — R07.9 CHEST PAIN, UNSPECIFIED TYPE: ICD-10-CM

## 2018-08-21 LAB
ANION GAP SERPL CALCULATED.3IONS-SCNC: 7 MMOL/L (ref 3–14)
BASOPHILS # BLD AUTO: 0.1 10E9/L (ref 0–0.2)
BASOPHILS NFR BLD AUTO: 0.8 %
BUN SERPL-MCNC: 16 MG/DL (ref 7–30)
CALCIUM SERPL-MCNC: 9.5 MG/DL (ref 8.5–10.1)
CHLORIDE SERPL-SCNC: 105 MMOL/L (ref 94–109)
CO2 SERPL-SCNC: 27 MMOL/L (ref 20–32)
CREAT SERPL-MCNC: 0.9 MG/DL (ref 0.52–1.04)
D DIMER PPP FEU-MCNC: 0.3 UG/ML FEU (ref 0–0.5)
DIFFERENTIAL METHOD BLD: NORMAL
EOSINOPHIL # BLD AUTO: 0.2 10E9/L (ref 0–0.7)
EOSINOPHIL NFR BLD AUTO: 2.6 %
ERYTHROCYTE [DISTWIDTH] IN BLOOD BY AUTOMATED COUNT: 13 % (ref 10–15)
GFR SERPL CREATININE-BSD FRML MDRD: 64 ML/MIN/1.7M2
GLUCOSE SERPL-MCNC: 104 MG/DL (ref 70–99)
HCT VFR BLD AUTO: 45.1 % (ref 35–47)
HGB BLD-MCNC: 15.2 G/DL (ref 11.7–15.7)
IMM GRANULOCYTES # BLD: 0 10E9/L (ref 0–0.4)
IMM GRANULOCYTES NFR BLD: 0.1 %
INTERPRETATION ECG - MUSE: NORMAL
LYMPHOCYTES # BLD AUTO: 1.6 10E9/L (ref 0.8–5.3)
LYMPHOCYTES NFR BLD AUTO: 21.5 %
MCH RBC QN AUTO: 31 PG (ref 26.5–33)
MCHC RBC AUTO-ENTMCNC: 33.7 G/DL (ref 31.5–36.5)
MCV RBC AUTO: 92 FL (ref 78–100)
MONOCYTES # BLD AUTO: 0.6 10E9/L (ref 0–1.3)
MONOCYTES NFR BLD AUTO: 7.4 %
NEUTROPHILS # BLD AUTO: 5 10E9/L (ref 1.6–8.3)
NEUTROPHILS NFR BLD AUTO: 67.6 %
NRBC # BLD AUTO: 0 10*3/UL
NRBC BLD AUTO-RTO: 0 /100
PLATELET # BLD AUTO: 235 10E9/L (ref 150–450)
POTASSIUM SERPL-SCNC: 4.3 MMOL/L (ref 3.4–5.3)
RBC # BLD AUTO: 4.91 10E12/L (ref 3.8–5.2)
SODIUM SERPL-SCNC: 139 MMOL/L (ref 133–144)
TROPONIN I SERPL-MCNC: <0.015 UG/L (ref 0–0.04)
WBC # BLD AUTO: 7.4 10E9/L (ref 4–11)

## 2018-08-21 PROCEDURE — 84484 ASSAY OF TROPONIN QUANT: CPT | Performed by: INTERNAL MEDICINE

## 2018-08-21 PROCEDURE — 93005 ELECTROCARDIOGRAM TRACING: CPT

## 2018-08-21 PROCEDURE — 85379 FIBRIN DEGRADATION QUANT: CPT | Performed by: INTERNAL MEDICINE

## 2018-08-21 PROCEDURE — 71046 X-RAY EXAM CHEST 2 VIEWS: CPT

## 2018-08-21 PROCEDURE — 85025 COMPLETE CBC W/AUTO DIFF WBC: CPT | Performed by: INTERNAL MEDICINE

## 2018-08-21 PROCEDURE — 99285 EMERGENCY DEPT VISIT HI MDM: CPT

## 2018-08-21 PROCEDURE — 80048 BASIC METABOLIC PNL TOTAL CA: CPT | Performed by: INTERNAL MEDICINE

## 2018-08-21 ASSESSMENT — ENCOUNTER SYMPTOMS
SHORTNESS OF BREATH: 1
FEVER: 0
COUGH: 0
BACK PAIN: 1
VOMITING: 0
DIARRHEA: 0

## 2018-08-21 NOTE — ED TRIAGE NOTES
"Pt c/o \"sternal pain\" with radiation along left breast.  Pt states she noticed the pain began on Sunday with intensifications last evening and increased pain with deep breathes. Pt reports the discomfort kept awake last night and is an 8/10.    Pt has a history of triple bypass.  "

## 2018-08-21 NOTE — ED AVS SNAPSHOT
[facility]    [address]    [facility] [address]    Phone:  [phone]    Fax:  [phone]                                       Marli Nguyen   MRN: [id number]    Department:  [facility]   Date of Visit:  8/21/2018           After Visit Summary Signature Page     I have received my discharge instructions, and my questions have been answered. I have discussed any challenges I see with this plan with the nurse or doctor.    ..........................................................................................................................................  Patient/Patient Representative Signature      ..........................................................................................................................................  Patient Representative Print Name and Relationship to Patient    ..................................................               ................................................  Date                                            Time    ..........................................................................................................................................  Reviewed by Signature/Title    ...................................................              ..............................................  Date                                                            Time

## 2018-08-21 NOTE — ED PROVIDER NOTES
History     Chief Complaint:  Chest Pain      HPI   Marli Nguyen is a 58 year old female with a history of gastric bypass surgery who presents to the emergency department for evaluation of chest pain. Upon evaluation, the patient states that she has had mid sternal pain  for two days that has been there consistently. This afternoon she started feeling the pain wrap around the left  to her back. She states that she was staining her deck the day before the onset of her pain. The patient also mentions that she has had shortness of breath for the past 12 hours and takes baby aspirin and beta blockers. She denies cough, fever,  PE, recent travel, diarrhea, and vomiting. Of note, she states that her ankle hurt last week but the pain resolved.              Allergies:  Bactrim  Erythromycin: cramps  Sulfa drugs    Medications:    Norvasc  Zetia  IMDUR  Synthroid  Cytomel  Lopressor  Rogaine mens  Nitrostat  Zantac  Crestor       Past Medical History:    Angina pectoris  Cardiac microvascular disease  Chronic left hip pain  Coronary artery disease  Type 2 diabetes  Gallstone pancreatitis  Graves disease    Past Surgical History:    Carpel tunnel release  Cholecystectomy  Lasik bilateral  Repair vaginal cuff  Right thumb mass excision      Family History:    Hyperlipidemia  Diabetes    Social History:  Smoking status: Never Smoker  Alcohol use: Yes    Marital Status:   [2]     Review of Systems   Constitutional: Negative for fever.   Respiratory: Positive for shortness of breath. Negative for cough.    Cardiovascular: Positive for chest pain.   Gastrointestinal: Negative for diarrhea and vomiting.   Musculoskeletal: Positive for back pain.   All other systems reviewed and are negative.        Physical Exam     Patient Vitals for the past 24 hrs:   BP Temp Temp src Pulse Heart Rate Resp SpO2 Height Weight   08/21/18 1600 119/62 - - - 59 - 94 % - -   08/21/18 1530 - - - - 62 - 92 % - -   08/21/18 1515 - - - -  "63 - 94 % - -   08/21/18 1514 - - - - 63 - 93 % - -   08/21/18 1501 - - - - 57 - 95 % - -   08/21/18 1500 - - - - 62 - - - -   08/21/18 1449 135/75 97.9  F (36.6  C) Oral 66 - 18 94 % 1.6 m (5' 3\") 93 kg (205 lb)         Physical Exam   Constitutional: She is cooperative.   HENT:   Right Ear: Tympanic membrane normal.   Left Ear: Tympanic membrane normal.   Mouth/Throat: Oropharynx is clear and moist and mucous membranes are normal.   Eyes: Conjunctivae are normal.   Neck: Normal range of motion.   Cardiovascular: Regular rhythm and normal heart sounds.    Pulmonary/Chest: Effort normal and breath sounds normal. She exhibits tenderness.   Abdominal: Soft. Normal appearance and bowel sounds are normal. There is no rebound and no guarding.   Musculoskeletal: Normal range of motion. She exhibits no edema or tenderness.   Lymphadenopathy:     She has no cervical adenopathy.   Neurological: She is alert.   No hyperesthesia of chest/back   Skin: Skin is warm and dry.   Psychiatric: She has a normal mood and affect.         Emergency Department Course   ECG (14:50:39):  Rate 62 bpm. MN interval 140. QRS duration 92. QT/QTc 422/428. P-R-T axes -7 51 65. Normal sinus rhythm, Nonspecific ST and T wave abnormality, Abnormal ECG,  Interpreted at 1508 by Ana Paula Roblero MD.      Imaging:  Radiographic findings were communicated with the patient who voiced understanding of the findings.    Chest XR, PA and LAT  IMPRESSION: No significant interval change. Mild scarring and/or  atelectasis at the left lung base laterally. The lungs are otherwise  clear. Sternotomy. No pleural effusions.  As read by Radiology     Laboratory:  CBC: WNL (WBC 7.4, HGB 15.2, )    BMP: Glucose 104 (H), WNL (Creatinine 0.90)    Troponin I: <0.015    D dimer quantitative: 0.3    Emergency Department Course:  Past medical records, nursing notes, and vitals reviewed.  1450: I performed an exam of the patient and obtained history, as documented " above.    IV inserted and blood drawn.    The patient was sent for a chest ex-ray while in the emergency department, findings above.    1604: I rechecked the patient.  Findings and plan explained to the Patient. Patient discharged home with instructions regarding supportive care, medications, and reasons to return. The importance of close follow-up was reviewed.     Impression & Plan      Medical Decision Making:    Marli Nguyen is a 58 year old female who presents to the emergency department with 2 days of ongoing pain in the central chest that she identifies as similar to the pain she had after her sternotomy.  She also has some pain wrapping around the left ribs.  I considered a broad differential.  Although her symptoms were not suggestive of cardiac ischemia EKG and troponin were obtained.  With these normal after over 48 hours of ongoing symptoms ACS is ruled out.  Patient does not have any risk factors for PE and her symptoms are atypical.  With a negative d-dimer I think this is effectively ruled out.  She does not have physical exam findings suggestive of zoster.  No evidence of infiltrate on chest x-ray.  No other significant lab abnormalities.  She may have sternal pain related to her activity yesterday.  I think we can manage symptomatically with close follow-up in clinic.  Chest wall pain instructions, return if worse or new symptoms.    Diagnosis:    ICD-10-CM    1. Chest pain, unspecified type R07.9        Disposition:  discharged to home    Discharge Medications:  New Prescriptions    No medications on file         Andrei Mitchell  8/21/2018   [facility] EMERGENCY DEPARTMENT    Scribe Disclosure:  I, Andrei Mitchell, am serving as a scribe at 2:50 PM on 8/21/2018 to document services personally performed by Ana Paula Roblero MD based on my observations and the provider's statements to me.        Ana Paula Roblero MD  08/21/18 1465

## 2018-08-21 NOTE — ED AVS SNAPSHOT
[facility] Emergency Department    [address]    [facility] [address]    Phone:  [phone]    Fax:  [phone]                                       Marli Nguyen   MRN: [id number]    Department:  [facility] Emergency Department   Date of Visit:  8/21/2018           Patient Information     Date Of Birth          1960        Your diagnoses for this visit were:     Chest pain, unspecified type        You were seen by Ana Paula Roblero MD.      Follow-up Information     Follow up with Aixa Brantley MD.    Specialty:  Family Practice    Contact information:    [facility]  [address]  [facility] [address]  [phone]          Discharge Instructions       Discharge Instructions  Chest Injury    You have been seen today because of a chest injury.  You may have contusion (bruise) of the chest or a rib fracture (break).  Rib fractures can be hard to see on x-ray, so we can t always be sure whether your rib is broken or bruised. Fortunately, the treatment of these injuries is usually the same, and includes pain control and preventing complications.    Return to the Emergency Department if:    You become short of breath.    You develop a fever over 101.5 degrees.    You pass out or become very weak or pale.    You have abdominal pain that is new or increasing.    You cough up blood.    You have new symptoms or anything that worries you.    Follow-up with your doctor:    As directed by your physician today.    If you are not improved in two weeks.    If you need more pain medicine, since we don t refill pain pills through the Emergency Department.    Home care instructions:    Chest injuries can be painful.  You may take a pain medication such as Tylenol  (acetaminophen), Advil  (ibuprofen), Nuprin  (ibuprofen) or Aleve  (naproxen).  If you have been given a narcotic such as Vicodin  (hydrocodone with acetaminophen), Percocet  (oxycodone with  acetaminophen), or codeine, do not drive for four hours after you have taken it. If the narcotic contains Tylenol  (acetaminophen), do not take Tylenol  with it. All narcotics will cause constipation, so eat a high fiber diet.      Applying ice packs to the painful area can help your pain.     Holding a pillow against your chest can help with pain when you need to move or cough.    You may need to rest and avoid lifting particularly in the first few days after your injury.    Prevention of pneumonia (lung infection) is also a part of managing chest injuries.  Because it can hurt to take deep breaths, you could develop collapsed areas of lung that can develop infection.  To prevent this, you need to take ten very deep breaths every hour while you are awake. Sometimes you will be given a device called an incentive spirometer to help with this. You also need to make yourself cough every hour.    Rib belts or binders are not generally recommended, since they may increase the risk of pneumonia. If you do use one, use it for only short periods of time.   If you were given a prescription for medicine here today, be sure to read all of the information (including the package insert) that comes with your prescription.  This will include important information about the medicine, its side effects, and any warnings that you need to know about.  The pharmacist who fills the prescription can provide more information and answer questions you may have about the medicine.  If you have questions or concerns that the pharmacist cannot address, please call or return to the Emergency Department.       Remember that you can always come back to the Emergency Department if you are not able to see your regular doctor in the amount of time listed above, if you get any new symptoms, or if there is anything that worries you.      24 Hour Appointment Hotline       To make an appointment at any [facility], call [phone]  ([phone]). If you don't have a family doctor or clinic, we will help you find one. [facility] are conveniently located to serve the needs of you and your family.             Review of your medicines      Our records show that you are taking the medicines listed below. If these are incorrect, please call your family doctor or clinic.        Dose / Directions Last dose taken    amLODIPine 5 MG tablet   Commonly known as:  NORVASC   Dose:  5 mg   Quantity:  180 tablet        Take 1 tablet (5 mg) by mouth 2 times daily   Refills:  2        arginine 500 MG tablet   Dose:  500 mg   Quantity:  180 tablet        Take 1 tablet (500 mg) by mouth 2 times daily   Refills:  3        aspirin 81 MG EC tablet   Dose:  162 mg        Take 162 mg by mouth At Bedtime   Refills:  0        CALCIUM-VITAMIN D PO   Dose:  1 tablet        Take 1 tablet by mouth every evening (strength unknown, does note that calcium is 800 mg)   Refills:  0        COENZYME Q-10 PO   Dose:  100 mg        Take 100 mg by mouth daily   Refills:  0        CRESTOR PO   Dose:  40 mg        Take 40 mg by mouth daily   Refills:  0        CYTOMEL 5 MCG tablet   Dose:  5 mcg   Generic drug:  liothyronine        Take 5 mcg by mouth daily   Refills:  0        isosorbide mononitrate 60 MG 24 hr tablet   Commonly known as:  IMDUR   Dose:  60 mg   Quantity:  90 tablet        Take 1 tablet (60 mg) by mouth daily   Refills:  0        MAGNESIUM OXIDE PO   Dose:  500 mg        Take 500 mg by mouth every evening   Refills:  0        metoprolol tartrate 25 MG tablet   Commonly known as:  LOPRESSOR   Dose:  25 mg   Quantity:  180 tablet        Take 1 tablet (25 mg) by mouth 2 times daily   Refills:  2        nitroGLYcerin 0.4 MG sublingual tablet   Commonly known as:  NITROSTAT   Dose:  0.4 mg   Quantity:  25 tablet        Place 1 tablet (0.4 mg) under the tongue every 5 minutes as needed   Refills:  3        NONFORMULARY   Dose:  1 tablet        Take 1 tablet by  mouth every morning Vitamin K complex 150mcg OTC   Refills:  0        OMEGA-3 FISH OIL PO   Dose:  1.2 g        Take 1.2 g by mouth 2 times daily   Refills:  0        ranitidine 150 MG tablet   Commonly known as:  ZANTAC   Dose:  150 mg   Quantity:  60 tablet        Take 1 tablet (150 mg) by mouth 2 times daily   Refills:  1        ROGAINE MENS 5 % Foam   Generic drug:  Minoxidil        Externally apply topically 2 times daily   Refills:  0        SYNTHROID PO   Dose:  88 mcg        Take 88 mcg by mouth daily   Refills:  0        UNABLE TO FIND        daily MEDICATION NAME: Nutrafol women's hair loss vitamin   Refills:  0        VITAMIN D (CHOLECALCIFEROL) PO   Dose:  5000 Units        Take 5,000 Units by mouth every evening   Refills:  0        ZETIA PO   Dose:  10 mg        Take 10 mg by mouth every morning   Refills:  0                Procedures and tests performed during your visit     Basic metabolic panel    CBC with platelets differential    Chest XR,  PA & LAT    D dimer quantitative    EKG 12 lead    Troponin I      Orders Needing Specimen Collection     None      Pending Results     Date and Time Order Name Status Description    8/21/2018 1449 EKG 12 lead Preliminary             Pending Culture Results     No orders found from 8/19/2018 to 8/22/2018.            Pending Results Instructions     If you had any lab results that were not finalized at the time of your Discharge, you can call the ED Lab Result RN at [phone]. You will be contacted by this team for any positive Lab results or changes in treatment. The nurses are available 7 days a week from 10A to 6:30P.  You can leave a message 24 hours per day and they will return your call.        Test Results From Your Hospital Stay        8/21/2018  3:19 PM      Component Results     Component Value Ref Range & Units Status    WBC 7.4 4.0 - 11.0 10e9/L Final    RBC Count 4.91 3.8 - 5.2 10e12/L Final    Hemoglobin 15.2 11.7 - 15.7 g/dL Final    Hematocrit  45.1 35.0 - 47.0 % Final    MCV 92 78 - 100 fl Final    MCH 31.0 26.5 - 33.0 pg Final    MCHC 33.7 31.5 - 36.5 g/dL Final    RDW 13.0 10.0 - 15.0 % Final    Platelet Count 235 150 - 450 10e9/L Final    Diff Method Automated Method  Final    % Neutrophils 67.6 % Final    % Lymphocytes 21.5 % Final    % Monocytes 7.4 % Final    % Eosinophils 2.6 % Final    % Basophils 0.8 % Final    % Immature Granulocytes 0.1 % Final    Nucleated RBCs 0 0 /100 Final    Absolute Neutrophil 5.0 1.6 - 8.3 10e9/L Final    Absolute Lymphocytes 1.6 0.8 - 5.3 10e9/L Final    Absolute Monocytes 0.6 0.0 - 1.3 10e9/L Final    Absolute Eosinophils 0.2 0.0 - 0.7 10e9/L Final    Absolute Basophils 0.1 0.0 - 0.2 10e9/L Final    Abs Immature Granulocytes 0.0 0 - 0.4 10e9/L Final    Absolute Nucleated RBC 0.0  Final         8/21/2018  3:36 PM      Component Results     Component Value Ref Range & Units Status    Sodium 139 133 - 144 mmol/L Final    Potassium 4.3 3.4 - 5.3 mmol/L Final    Chloride 105 94 - 109 mmol/L Final    Carbon Dioxide 27 20 - 32 mmol/L Final    Anion Gap 7 3 - 14 mmol/L Final    Glucose 104 (H) 70 - 99 mg/dL Final    Urea Nitrogen 16 7 - 30 mg/dL Final    Creatinine 0.90 0.52 - 1.04 mg/dL Final    GFR Estimate 64 >60 mL/min/1.7m2 Final    Non  GFR Calc    GFR Estimate If Black 77 >60 mL/min/1.7m2 Final    African American GFR Calc    Calcium 9.5 8.5 - 10.1 mg/dL Final         8/21/2018  3:36 PM      Component Results     Component Value Ref Range & Units Status    Troponin I ES <0.015 0.000 - 0.045 ug/L Final    The 99th percentile for upper reference range is 0.045 ug/L.  Troponin values   in the range of 0.045 - 0.120 ug/L may be associated with risks of adverse   clinical events.           8/21/2018  3:57 PM      Narrative     CHEST TWO VIEWS  8/21/2018 3:50 PM     HISTORY:  Chest pain.    COMPARISON: 4/16/2018.        Impression     IMPRESSION: No significant interval change. Mild scarring  and/or  atelectasis at the left lung base laterally. The lungs are otherwise  clear. Sternotomy. No pleural effusions.    ALVARO QUINONEZ MD         8/21/2018  3:29 PM      Component Results     Component Value Ref Range & Units Status    D Dimer 0.3 0.0 - 0.50 ug/ml FEU Final    This D-dimer assay is intended for use in conjunction with a clinical pretest   probability assessment model to exclude pulmonary embolism (PE) and deep   venous thrombosis (DVT) in outpatients suspected of PE or DVT. The cut-off   value is 0.5 ug/mL FEU.                  Clinical Quality Measure: Blood Pressure Screening     Your blood pressure was checked while you were in the emergency department today. The last reading we obtained was  BP: 135/75 . Please read the guidelines below about what these numbers mean and what you should do about them.  If your systolic blood pressure (the top number) is less than 120 and your diastolic blood pressure (the bottom number) is less than 80, then your blood pressure is normal. There is nothing more that you need to do about it.  If your systolic blood pressure (the top number) is 120-139 or your diastolic blood pressure (the bottom number) is 80-89, your blood pressure may be higher than it should be. You should have your blood pressure rechecked within a year by a primary care provider.  If your systolic blood pressure (the top number) is 140 or greater or your diastolic blood pressure (the bottom number) is 90 or greater, you may have high blood pressure. High blood pressure is treatable, but if left untreated over time it can put you at risk for heart attack, stroke, or kidney failure. You should have your blood pressure rechecked by a primary care provider within the next 4 weeks.  If your provider in the emergency department today gave you specific instructions to follow-up with your doctor or provider even sooner than that, you should follow that instruction and not wait for up to 4 weeks  for your follow-up visit.        Thank you for choosing [facility]       Thank you for choosing [facility] for your care. Our goal is always to provide you with excellent care. Hearing back from our patients is one way we can continue to improve our services. Please take a few minutes to complete the written survey that you may receive in the mail after you visit with us. Thank you!        [facility] Information     [facility] gives you secure access to your electronic health record. If you see a primary care provider, you can also send messages to your care team and make appointments. If you have questions, please call your primary care clinic.  If you do not have a primary care provider, please call [phone] and they will assist you.        Care EveryWhere ID     This is your Care EveryWhere ID. This could be used by other organizations to access your [facility] medical records  [id number]        Equal Access to Services     MAZIN ALLISON : Lily Adamson, razia faye, anabella kendall. So [facility] [phone].    ATENCIÓN: Si habla español, tiene a griffiths disposición servicios gratuitos de asistencia lingüística. Llame al [phone].    We comply with applicable federal civil rights laws and [place] laws. We do not discriminate on the basis of race, color, national origin, age, disability, sex, sexual orientation, or gender identity.            After Visit Summary       This is your record. Keep this with you and show to your community pharmacist(s) and doctor(s) at your next visit.

## 2018-08-21 NOTE — DISCHARGE INSTRUCTIONS
Discharge Instructions  Chest Injury    You have been seen today because of a chest injury.  You may have contusion (bruise) of the chest or a rib fracture (break).  Rib fractures can be hard to see on x-ray, so we can t always be sure whether your rib is broken or bruised. Fortunately, the treatment of these injuries is usually the same, and includes pain control and preventing complications.    Return to the Emergency Department if:    You become short of breath.    You develop a fever over 101.5 degrees.    You pass out or become very weak or pale.    You have abdominal pain that is new or increasing.    You cough up blood.    You have new symptoms or anything that worries you.    Follow-up with your doctor:    As directed by your physician today.    If you are not improved in two weeks.    If you need more pain medicine, since we don t refill pain pills through the Emergency Department.    Home care instructions:    Chest injuries can be painful.  You may take a pain medication such as Tylenol  (acetaminophen), Advil  (ibuprofen), Nuprin  (ibuprofen) or Aleve  (naproxen).  If you have been given a narcotic such as Vicodin  (hydrocodone with acetaminophen), Percocet  (oxycodone with acetaminophen), or codeine, do not drive for four hours after you have taken it. If the narcotic contains Tylenol  (acetaminophen), do not take Tylenol  with it. All narcotics will cause constipation, so eat a high fiber diet.      Applying ice packs to the painful area can help your pain.     Holding a pillow against your chest can help with pain when you need to move or cough.    You may need to rest and avoid lifting particularly in the first few days after your injury.    Prevention of pneumonia (lung infection) is also a part of managing chest injuries.  Because it can hurt to take deep breaths, you could develop collapsed areas of lung that can develop infection.  To prevent this, you need to take ten very deep breaths every  hour while you are awake. Sometimes you will be given a device called an incentive spirometer to help with this. You also need to make yourself cough every hour.    Rib belts or binders are not generally recommended, since they may increase the risk of pneumonia. If you do use one, use it for only short periods of time.   If you were given a prescription for medicine here today, be sure to read all of the information (including the package insert) that comes with your prescription.  This will include important information about the medicine, its side effects, and any warnings that you need to know about.  The pharmacist who fills the prescription can provide more information and answer questions you may have about the medicine.  If you have questions or concerns that the pharmacist cannot address, please call or return to the Emergency Department.       Remember that you can always come back to the Emergency Department if you are not able to see your regular doctor in the amount of time listed above, if you get any new symptoms, or if there is anything that worries you.

## 2018-10-23 DIAGNOSIS — I25.10 CORONARY ARTERY DISEASE INVOLVING NATIVE CORONARY ARTERY OF NATIVE HEART WITHOUT ANGINA PECTORIS: ICD-10-CM

## 2018-10-23 RX ORDER — AMLODIPINE BESYLATE 5 MG/1
5 TABLET ORAL 2 TIMES DAILY
Qty: 180 TABLET | Refills: 2 | Status: SHIPPED | OUTPATIENT
Start: 2018-10-23

## 2018-11-20 DIAGNOSIS — I25.119 CORONARY ARTERY DISEASE INVOLVING NATIVE CORONARY ARTERY OF NATIVE HEART WITH ANGINA PECTORIS (H): ICD-10-CM

## 2018-11-20 RX ORDER — METOPROLOL TARTRATE 25 MG/1
25 TABLET, FILM COATED ORAL 2 TIMES DAILY
Qty: 180 TABLET | Refills: 2 | Status: SHIPPED | OUTPATIENT
Start: 2018-11-20 | End: 2019-08-21

## 2019-01-03 ENCOUNTER — TELEPHONE (OUTPATIENT)
Dept: CARDIOLOGY | Facility: CLINIC | Age: 59
End: 2019-01-03

## 2019-01-03 NOTE — TELEPHONE ENCOUNTER
Pt called to inquire if she is due to be seen by Dr. Sheppard, as she is moving in 3 weeks and wanted to make sure she did not need to try and make an appointment prior to moving. Pt stated she is moving to [place], [place]. Advised pt that at last visit on 6/6/18, Dr. Sheppard stated pt should follow up annually or as needed. Advised pt if she wishes to travel to come in for appointments she may make an appointment in June 2019, or sooner if needed. Pt may also establish with a local cardiologist if she prefers. Pt stated she is feeling great and denied any cardiac symptoms at this time. Per review of chart, pt should have enough medication refills to last until June 2019. Pt stated she will call back to schedule her annual follow up closer to June.

## 2019-01-30 DIAGNOSIS — I20.0 UNSTABLE ANGINA (H): ICD-10-CM

## 2019-01-30 DIAGNOSIS — I25.10 CORONARY ARTERY DISEASE INVOLVING NATIVE CORONARY ARTERY OF NATIVE HEART WITHOUT ANGINA PECTORIS: ICD-10-CM

## 2019-01-30 RX ORDER — NITROGLYCERIN 0.4 MG/1
0.4 TABLET SUBLINGUAL EVERY 5 MIN PRN
Qty: 25 TABLET | Refills: 3 | Status: SHIPPED | OUTPATIENT
Start: 2019-01-30

## 2019-04-01 DIAGNOSIS — I25.10 CAD (CORONARY ARTERY DISEASE): ICD-10-CM

## 2019-04-01 RX ORDER — ISOSORBIDE MONONITRATE 60 MG/1
60 TABLET, EXTENDED RELEASE ORAL DAILY
Qty: 90 TABLET | Refills: 0 | Status: SHIPPED | OUTPATIENT
Start: 2019-04-01 | End: 2019-07-02

## 2019-07-02 DIAGNOSIS — I25.10 CAD (CORONARY ARTERY DISEASE): ICD-10-CM

## 2019-07-02 RX ORDER — ISOSORBIDE MONONITRATE 60 MG/1
60 TABLET, EXTENDED RELEASE ORAL DAILY
Qty: 90 TABLET | Refills: 0 | Status: SHIPPED | OUTPATIENT
Start: 2019-07-02 | End: 2019-09-27

## 2019-08-21 DIAGNOSIS — I25.119 CORONARY ARTERY DISEASE INVOLVING NATIVE CORONARY ARTERY OF NATIVE HEART WITH ANGINA PECTORIS (H): ICD-10-CM

## 2019-08-21 RX ORDER — METOPROLOL TARTRATE 25 MG/1
25 TABLET, FILM COATED ORAL 2 TIMES DAILY
Qty: 180 TABLET | Refills: 0 | Status: SHIPPED | OUTPATIENT
Start: 2019-08-21

## 2019-09-27 ENCOUNTER — TELEPHONE (OUTPATIENT)
Dept: CARDIOLOGY | Facility: CLINIC | Age: 59
End: 2019-09-27

## 2019-09-27 DIAGNOSIS — I25.10 CAD (CORONARY ARTERY DISEASE): ICD-10-CM

## 2019-09-27 RX ORDER — ISOSORBIDE MONONITRATE 60 MG/1
60 TABLET, EXTENDED RELEASE ORAL DAILY
Qty: 90 TABLET | Refills: 0 | Status: SHIPPED | OUTPATIENT
Start: 2019-09-27

## 2019-09-27 NOTE — TELEPHONE ENCOUNTER
Received refill request for isosorbide mononitrate 60 mg daily. Last visit with Dr. Sheppard on 6/18/18. Per telephone encounter on 1/3/19, pt has relocated to [place], [place]. Pt had planned to continue to follow up with Dr. Sheppard but no appointments have been scheduled. Called pt, no answer. LVM advising will refill for additional 90 days, however pt should request future refills from her local provider or schedule with Dr. Sheppard if she plans to continue being seen at our clinic. Left call back number for Team 1.

## 2019-12-15 ENCOUNTER — HEALTH MAINTENANCE LETTER (OUTPATIENT)
Age: 59
End: 2019-12-15

## 2021-06-02 ENCOUNTER — RECORDS - HEALTHEAST (OUTPATIENT)
Dept: ADMINISTRATIVE | Facility: CLINIC | Age: 61
End: 2021-06-02

## (undated) RX ORDER — VERAPAMIL HYDROCHLORIDE 2.5 MG/ML
INJECTION, SOLUTION INTRAVENOUS
Status: DISPENSED
Start: 2017-03-02

## (undated) RX ORDER — FENTANYL CITRATE 50 UG/ML
INJECTION, SOLUTION INTRAMUSCULAR; INTRAVENOUS
Status: DISPENSED
Start: 2017-03-02

## (undated) RX ORDER — NITROGLYCERIN 5 MG/ML
VIAL (ML) INTRAVENOUS
Status: DISPENSED
Start: 2017-03-02

## (undated) RX ORDER — HEPARIN SODIUM 1000 [USP'U]/ML
INJECTION, SOLUTION INTRAVENOUS; SUBCUTANEOUS
Status: DISPENSED
Start: 2017-03-02

## (undated) RX ORDER — NITROGLYCERIN 0.4 MG/1
TABLET SUBLINGUAL
Status: DISPENSED
Start: 2017-03-02